# Patient Record
Sex: FEMALE | Race: OTHER | HISPANIC OR LATINO | URBAN - METROPOLITAN AREA
[De-identification: names, ages, dates, MRNs, and addresses within clinical notes are randomized per-mention and may not be internally consistent; named-entity substitution may affect disease eponyms.]

---

## 2017-05-19 ENCOUNTER — INPATIENT (INPATIENT)
Facility: HOSPITAL | Age: 37
LOS: 4 days | Discharge: HOME | End: 2017-05-24
Attending: SPECIALIST | Admitting: SPECIALIST
Payer: COMMERCIAL

## 2017-05-19 VITALS
DIASTOLIC BLOOD PRESSURE: 72 MMHG | RESPIRATION RATE: 18 BRPM | SYSTOLIC BLOOD PRESSURE: 116 MMHG | TEMPERATURE: 98 F | OXYGEN SATURATION: 97 % | HEART RATE: 64 BPM

## 2017-05-19 DIAGNOSIS — O26.899 OTHER SPECIFIED PREGNANCY RELATED CONDITIONS, UNSPECIFIED TRIMESTER: ICD-10-CM

## 2017-05-19 DIAGNOSIS — Z3A.00 WEEKS OF GESTATION OF PREGNANCY NOT SPECIFIED: ICD-10-CM

## 2017-05-19 LAB
BASOPHILS # BLD AUTO: 0.01 K/UL — SIGNIFICANT CHANGE UP (ref 0–0.2)
BASOPHILS NFR BLD AUTO: 0.1 % — SIGNIFICANT CHANGE UP (ref 0–2)
EOSINOPHIL # BLD AUTO: 0.04 K/UL — SIGNIFICANT CHANGE UP (ref 0–0.5)
EOSINOPHIL NFR BLD AUTO: 0.4 % — SIGNIFICANT CHANGE UP (ref 0–6)
HCT VFR BLD CALC: 37.8 % — SIGNIFICANT CHANGE UP (ref 34.5–45)
HGB BLD-MCNC: 12.6 G/DL — SIGNIFICANT CHANGE UP (ref 11.5–15.5)
IMM GRANULOCYTES NFR BLD AUTO: 0.3 % — SIGNIFICANT CHANGE UP (ref 0–1.5)
LYMPHOCYTES # BLD AUTO: 2.15 K/UL — SIGNIFICANT CHANGE UP (ref 1–3.3)
LYMPHOCYTES # BLD AUTO: 21.3 % — SIGNIFICANT CHANGE UP (ref 13–44)
MCHC RBC-ENTMCNC: 31.2 PG — SIGNIFICANT CHANGE UP (ref 27–34)
MCHC RBC-ENTMCNC: 33.3 % — SIGNIFICANT CHANGE UP (ref 32–36)
MCV RBC AUTO: 93.6 FL — SIGNIFICANT CHANGE UP (ref 80–100)
MONOCYTES # BLD AUTO: 0.66 K/UL — SIGNIFICANT CHANGE UP (ref 0–0.9)
MONOCYTES NFR BLD AUTO: 6.5 % — SIGNIFICANT CHANGE UP (ref 2–14)
NEUTROPHILS # BLD AUTO: 7.2 K/UL — SIGNIFICANT CHANGE UP (ref 1.8–7.4)
NEUTROPHILS NFR BLD AUTO: 71.4 % — SIGNIFICANT CHANGE UP (ref 43–77)
PLATELET # BLD AUTO: 258 K/UL — SIGNIFICANT CHANGE UP (ref 150–400)
PMV BLD: 9.7 FL — SIGNIFICANT CHANGE UP (ref 7–13)
RBC # BLD: 4.04 M/UL — SIGNIFICANT CHANGE UP (ref 3.8–5.2)
RBC # FLD: 13.5 % — SIGNIFICANT CHANGE UP (ref 10.3–14.5)
WBC # BLD: 10.09 K/UL — SIGNIFICANT CHANGE UP (ref 3.8–10.5)
WBC # FLD AUTO: 10.09 K/UL — SIGNIFICANT CHANGE UP (ref 3.8–10.5)

## 2017-05-19 RX ORDER — OXYTOCIN 10 UNIT/ML
333.33 VIAL (ML) INJECTION
Qty: 20 | Refills: 0 | Status: DISCONTINUED | OUTPATIENT
Start: 2017-05-19 | End: 2017-05-19

## 2017-05-19 RX ORDER — SODIUM CHLORIDE 9 MG/ML
1000 INJECTION, SOLUTION INTRAVENOUS
Qty: 0 | Refills: 0 | Status: DISCONTINUED | OUTPATIENT
Start: 2017-05-19 | End: 2017-05-21

## 2017-05-19 RX ORDER — OXYTOCIN 10 UNIT/ML
333.33 VIAL (ML) INJECTION
Qty: 20 | Refills: 0 | Status: COMPLETED | OUTPATIENT
Start: 2017-05-19

## 2017-05-19 RX ORDER — SODIUM CHLORIDE 9 MG/ML
1000 INJECTION, SOLUTION INTRAVENOUS ONCE
Qty: 0 | Refills: 0 | Status: DISCONTINUED | OUTPATIENT
Start: 2017-05-19 | End: 2017-05-19

## 2017-05-19 RX ORDER — SODIUM CHLORIDE 9 MG/ML
1000 INJECTION, SOLUTION INTRAVENOUS
Qty: 0 | Refills: 0 | Status: DISCONTINUED | OUTPATIENT
Start: 2017-05-19 | End: 2017-05-19

## 2017-05-19 RX ORDER — CITRIC ACID/SODIUM CITRATE 300-500 MG
15 SOLUTION, ORAL ORAL EVERY 4 HOURS
Qty: 0 | Refills: 0 | Status: DISCONTINUED | OUTPATIENT
Start: 2017-05-19 | End: 2017-05-21

## 2017-05-20 LAB — T PALLIDUM AB TITR SER: NEGATIVE — SIGNIFICANT CHANGE UP

## 2017-05-20 PROCEDURE — 93010 ELECTROCARDIOGRAM REPORT: CPT

## 2017-05-20 RX ORDER — MORPHINE SULFATE 50 MG/1
4 CAPSULE, EXTENDED RELEASE ORAL ONCE
Qty: 0 | Refills: 0 | Status: DISCONTINUED | OUTPATIENT
Start: 2017-05-20 | End: 2017-05-21

## 2017-05-20 RX ORDER — OXYTOCIN 10 UNIT/ML
333.33 VIAL (ML) INJECTION
Qty: 20 | Refills: 0 | Status: DISCONTINUED | OUTPATIENT
Start: 2017-05-20 | End: 2017-05-23

## 2017-05-20 RX ORDER — OXYTOCIN 10 UNIT/ML
2 VIAL (ML) INJECTION
Qty: 30 | Refills: 0 | Status: DISCONTINUED | OUTPATIENT
Start: 2017-05-20 | End: 2017-05-21

## 2017-05-20 RX ADMIN — SODIUM CHLORIDE 125 MILLILITER(S): 9 INJECTION, SOLUTION INTRAVENOUS at 20:01

## 2017-05-20 RX ADMIN — Medication 2 MILLIUNIT(S)/MIN: at 20:01

## 2017-05-20 RX ADMIN — Medication 2 MILLIUNIT(S)/MIN: at 17:54

## 2017-05-21 ENCOUNTER — TRANSCRIPTION ENCOUNTER (OUTPATIENT)
Age: 37
End: 2017-05-21

## 2017-05-21 LAB
BUN SERPL-MCNC: 5 MG/DL — LOW (ref 7–23)
CALCIUM SERPL-MCNC: 8.7 MG/DL — SIGNIFICANT CHANGE UP (ref 8.4–10.5)
CHLORIDE SERPL-SCNC: 104 MMOL/L — SIGNIFICANT CHANGE UP (ref 98–107)
CO2 SERPL-SCNC: 21 MMOL/L — LOW (ref 22–31)
CREAT SERPL-MCNC: 0.53 MG/DL — SIGNIFICANT CHANGE UP (ref 0.5–1.3)
GLUCOSE SERPL-MCNC: 107 MG/DL — HIGH (ref 70–99)
POTASSIUM SERPL-MCNC: 4.2 MMOL/L — SIGNIFICANT CHANGE UP (ref 3.5–5.3)
POTASSIUM SERPL-SCNC: 4.2 MMOL/L — SIGNIFICANT CHANGE UP (ref 3.5–5.3)
SODIUM SERPL-SCNC: 139 MMOL/L — SIGNIFICANT CHANGE UP (ref 135–145)

## 2017-05-21 PROCEDURE — 93010 ELECTROCARDIOGRAM REPORT: CPT

## 2017-05-21 RX ORDER — FERROUS SULFATE 325(65) MG
325 TABLET ORAL DAILY
Qty: 0 | Refills: 0 | Status: DISCONTINUED | OUTPATIENT
Start: 2017-05-21 | End: 2017-05-24

## 2017-05-21 RX ORDER — OXYCODONE HYDROCHLORIDE 5 MG/1
5 TABLET ORAL EVERY 4 HOURS
Qty: 0 | Refills: 0 | Status: DISCONTINUED | OUTPATIENT
Start: 2017-05-21 | End: 2017-05-24

## 2017-05-21 RX ORDER — SODIUM CHLORIDE 9 MG/ML
1000 INJECTION, SOLUTION INTRAVENOUS
Qty: 0 | Refills: 0 | Status: DISCONTINUED | OUTPATIENT
Start: 2017-05-21 | End: 2017-05-22

## 2017-05-21 RX ORDER — OXYTOCIN 10 UNIT/ML
41.67 VIAL (ML) INJECTION
Qty: 20 | Refills: 0 | Status: DISCONTINUED | OUTPATIENT
Start: 2017-05-21 | End: 2017-05-23

## 2017-05-21 RX ORDER — SIMETHICONE 80 MG/1
80 TABLET, CHEWABLE ORAL EVERY 4 HOURS
Qty: 0 | Refills: 0 | Status: DISCONTINUED | OUTPATIENT
Start: 2017-05-21 | End: 2017-05-24

## 2017-05-21 RX ORDER — TETANUS TOXOID, REDUCED DIPHTHERIA TOXOID AND ACELLULAR PERTUSSIS VACCINE, ADSORBED 5; 2.5; 8; 8; 2.5 [IU]/.5ML; [IU]/.5ML; UG/.5ML; UG/.5ML; UG/.5ML
0.5 SUSPENSION INTRAMUSCULAR ONCE
Qty: 0 | Refills: 0 | Status: DISCONTINUED | OUTPATIENT
Start: 2017-05-21 | End: 2017-05-24

## 2017-05-21 RX ORDER — DIPHENHYDRAMINE HCL 50 MG
25 CAPSULE ORAL EVERY 6 HOURS
Qty: 0 | Refills: 0 | Status: DISCONTINUED | OUTPATIENT
Start: 2017-05-21 | End: 2017-05-24

## 2017-05-21 RX ORDER — LANOLIN
1 OINTMENT (GRAM) TOPICAL
Qty: 0 | Refills: 0 | Status: DISCONTINUED | OUTPATIENT
Start: 2017-05-21 | End: 2017-05-24

## 2017-05-21 RX ORDER — METOCLOPRAMIDE HCL 10 MG
10 TABLET ORAL ONCE
Qty: 0 | Refills: 0 | Status: DISCONTINUED | OUTPATIENT
Start: 2017-05-21 | End: 2017-05-21

## 2017-05-21 RX ORDER — FAMOTIDINE 10 MG/ML
20 INJECTION INTRAVENOUS ONCE
Qty: 0 | Refills: 0 | Status: DISCONTINUED | OUTPATIENT
Start: 2017-05-21 | End: 2017-05-21

## 2017-05-21 RX ORDER — OXYTOCIN 10 UNIT/ML
333.33 VIAL (ML) INJECTION
Qty: 20 | Refills: 0 | Status: DISCONTINUED | OUTPATIENT
Start: 2017-05-21 | End: 2017-05-23

## 2017-05-21 RX ORDER — DOCUSATE SODIUM 100 MG
100 CAPSULE ORAL
Qty: 0 | Refills: 0 | Status: DISCONTINUED | OUTPATIENT
Start: 2017-05-21 | End: 2017-05-24

## 2017-05-21 RX ORDER — GLYCERIN ADULT
1 SUPPOSITORY, RECTAL RECTAL AT BEDTIME
Qty: 0 | Refills: 0 | Status: DISCONTINUED | OUTPATIENT
Start: 2017-05-21 | End: 2017-05-24

## 2017-05-21 RX ORDER — IBUPROFEN 200 MG
600 TABLET ORAL EVERY 6 HOURS
Qty: 0 | Refills: 0 | Status: COMPLETED | OUTPATIENT
Start: 2017-05-21 | End: 2018-04-19

## 2017-05-21 RX ORDER — ONDANSETRON 8 MG/1
4 TABLET, FILM COATED ORAL ONCE
Qty: 0 | Refills: 0 | Status: COMPLETED | OUTPATIENT
Start: 2017-05-21 | End: 2017-05-21

## 2017-05-21 RX ORDER — CITRIC ACID/SODIUM CITRATE 300-500 MG
30 SOLUTION, ORAL ORAL ONCE
Qty: 0 | Refills: 0 | Status: DISCONTINUED | OUTPATIENT
Start: 2017-05-21 | End: 2017-05-21

## 2017-05-21 RX ORDER — ACETAMINOPHEN 500 MG
975 TABLET ORAL EVERY 6 HOURS
Qty: 0 | Refills: 0 | Status: COMPLETED | OUTPATIENT
Start: 2017-05-21 | End: 2018-04-19

## 2017-05-21 RX ORDER — OXYCODONE HYDROCHLORIDE 5 MG/1
5 TABLET ORAL
Qty: 0 | Refills: 0 | Status: DISCONTINUED | OUTPATIENT
Start: 2017-05-21 | End: 2017-05-24

## 2017-05-21 RX ORDER — KETOROLAC TROMETHAMINE 30 MG/ML
30 SYRINGE (ML) INJECTION EVERY 6 HOURS
Qty: 0 | Refills: 0 | Status: DISCONTINUED | OUTPATIENT
Start: 2017-05-21 | End: 2017-05-22

## 2017-05-21 RX ORDER — HEPARIN SODIUM 5000 [USP'U]/ML
5000 INJECTION INTRAVENOUS; SUBCUTANEOUS EVERY 12 HOURS
Qty: 0 | Refills: 0 | Status: DISCONTINUED | OUTPATIENT
Start: 2017-05-21 | End: 2017-05-24

## 2017-05-21 RX ADMIN — Medication 30 MILLIGRAM(S): at 19:35

## 2017-05-21 RX ADMIN — FAMOTIDINE 20 MILLIGRAM(S): 10 INJECTION INTRAVENOUS at 12:01

## 2017-05-21 RX ADMIN — HEPARIN SODIUM 5000 UNIT(S): 5000 INJECTION INTRAVENOUS; SUBCUTANEOUS at 18:52

## 2017-05-21 RX ADMIN — ONDANSETRON 4 MILLIGRAM(S): 8 TABLET, FILM COATED ORAL at 10:25

## 2017-05-21 RX ADMIN — Medication 10 MILLIGRAM(S): at 12:01

## 2017-05-21 RX ADMIN — Medication 30 MILLILITER(S): at 12:01

## 2017-05-21 RX ADMIN — Medication 30 MILLIGRAM(S): at 18:52

## 2017-05-21 RX ADMIN — Medication 125 MILLIUNIT(S)/MIN: at 14:24

## 2017-05-21 RX ADMIN — Medication 1000 MILLIUNIT(S)/MIN: at 14:24

## 2017-05-21 NOTE — DISCHARGE NOTE OB - MEDICATION SUMMARY - MEDICATIONS TO TAKE
I will START or STAY ON the medications listed below when I get home from the hospital:    Prenatabs Rx oral tablet  -- 1 tab(s) by mouth once a day  -- Indication: For Other pregnancy-related conditions, antepartum I will START or STAY ON the medications listed below when I get home from the hospital:    acetaminophen 325 mg oral tablet  -- 3 tab(s) by mouth every 6 hours, As Needed  -- Indication: For pain    ibuprofen 600 mg oral tablet  -- 1 tab(s) by mouth every 6 hours, As Needed  -- Indication: For pain    Prenatabs Rx oral tablet  -- 1 tab(s) by mouth once a day  -- Indication: For maternal health

## 2017-05-21 NOTE — PACU DISCHARGE NOTE - COMMENTS
baseline asymptomatic bradycardia.  EKG with Sinus Bradycardia.  Patient is sitting upright with stable  BP and completely without symptoms.

## 2017-05-21 NOTE — DISCHARGE NOTE OB - PATIENT PORTAL LINK FT
“You can access the [employer] Patient Portal, offered by [facility], by registering with the following website: [email or web address]”

## 2017-05-21 NOTE — DISCHARGE NOTE OB - CARE PROVIDER_API CALL
Jd Mathis (MD), Obstetrics and Gynecology  [address] [place] [address]  Phone: [phone]  Fax: [phone]

## 2017-05-21 NOTE — DISCHARGE NOTE OB - MATERIALS PROVIDED
Bottle Feeding Log/[place]  Immunization Record/Guide to Postpartum Care/Discharge Medication Information for Patients and Families Pocket Guide/Vaccinations/[facility] [place] Screening Program/Breastfeeding Log/Shaken Baby Prevention Handout

## 2017-05-21 NOTE — CHART NOTE - NSCHARTNOTEFT_GEN_A_CORE
Patient is 36y  old.    Event :bradycardia 39 bpm    Vital Signs Last 24 Hrs  T(C): 36.8, Max: 36.8 (05-21 @ 13:30)  T(F): 98.2, Max: 98.2 (05-21 @ 13:30)  HR: 42 (42 - 56)  BP: 122/60 (81/69 - 122/60)  BP(mean): 72 (41 - 72)  RR: 14 (14 - 20)  SpO2: 100% (100% - 100%)    I&O's Detail  I & Os for 24h ending 21 May 2017 07:00  =============================================  IN:    lactated ringers.: 1875 ml    Total IN: 1875 ml  ---------------------------------------------  OUT:    Indwelling Catheter - Urethral: 1350 ml    Total OUT: 1350 ml  ---------------------------------------------  Total NET: 525 ml    I & Os for current day (as of 21 May 2017 14:55)  =============================================  IN:    Other: 2000 ml    Total IN: 2000 ml  ---------------------------------------------  OUT:    Indwelling Catheter - Urethral: 1200 ml    Estimated Blood Loss: 950 ml    Other: 150 ml    Total OUT: 2300 ml  ---------------------------------------------  Total NET: -300 ml      Labs:                        12.6   10.09 )-----------( 258      ( 19 May 2017 17:15 )             37.8             Fibrinogen:     Lactate:     MEDICATIONS  (STANDING):  oxytocin Infusion 333.333milliUNIT(s)/Min IV Continuous <Continuous>  oxytocin Infusion 333.333milliUNIT(s)/Min IV Continuous <Continuous>  oxytocin Infusion 41.667milliUNIT(s)/Min IV Continuous <Continuous>  ketorolac   Injectable 30milliGRAM(s) IV Push every 6 hours      Evaluation :bradycardia 39 bpm SB, was 43 BP yesterday on EKG with SB, VSS, no complaints    Differential Dg :chronic SB, new arrhythmia, medication effect, electrolyte abnormaility    Management and Follow-up plan :EKG shows SB at 39 bpm, BMP stat ordered, monitor Cardiology consult if further symptoms develop or decreased HR              -------------------------------------------------------------------------------------------------------------

## 2017-05-21 NOTE — DISCHARGE NOTE OB - CARE PLAN
Principal Discharge DX:	 delivery delivered  Goal:	discharge 17  Instructions for follow-up, activity and diet:	follow up in 1 week

## 2017-05-22 LAB
BASOPHILS # BLD AUTO: 0.01 K/UL — SIGNIFICANT CHANGE UP (ref 0–0.2)
BASOPHILS NFR BLD AUTO: 0.1 % — SIGNIFICANT CHANGE UP (ref 0–2)
EOSINOPHIL # BLD AUTO: 0.02 K/UL — SIGNIFICANT CHANGE UP (ref 0–0.5)
EOSINOPHIL NFR BLD AUTO: 0.1 % — SIGNIFICANT CHANGE UP (ref 0–6)
HCT VFR BLD CALC: 29.1 % — LOW (ref 34.5–45)
HGB BLD-MCNC: 9.7 G/DL — LOW (ref 11.5–15.5)
IMM GRANULOCYTES NFR BLD AUTO: 0.3 % — SIGNIFICANT CHANGE UP (ref 0–1.5)
LYMPHOCYTES # BLD AUTO: 14.2 % — SIGNIFICANT CHANGE UP (ref 13–44)
LYMPHOCYTES # BLD AUTO: 2.38 K/UL — SIGNIFICANT CHANGE UP (ref 1–3.3)
MCHC RBC-ENTMCNC: 31.1 PG — SIGNIFICANT CHANGE UP (ref 27–34)
MCHC RBC-ENTMCNC: 33.3 % — SIGNIFICANT CHANGE UP (ref 32–36)
MCV RBC AUTO: 93.3 FL — SIGNIFICANT CHANGE UP (ref 80–100)
MONOCYTES # BLD AUTO: 0.86 K/UL — SIGNIFICANT CHANGE UP (ref 0–0.9)
MONOCYTES NFR BLD AUTO: 5.1 % — SIGNIFICANT CHANGE UP (ref 2–14)
NEUTROPHILS # BLD AUTO: 13.42 K/UL — HIGH (ref 1.8–7.4)
NEUTROPHILS NFR BLD AUTO: 80.2 % — HIGH (ref 43–77)
PLATELET # BLD AUTO: 211 K/UL — SIGNIFICANT CHANGE UP (ref 150–400)
PMV BLD: 9.5 FL — SIGNIFICANT CHANGE UP (ref 7–13)
RBC # BLD: 3.12 M/UL — LOW (ref 3.8–5.2)
RBC # FLD: 13.6 % — SIGNIFICANT CHANGE UP (ref 10.3–14.5)
WBC # BLD: 16.74 K/UL — HIGH (ref 3.8–10.5)
WBC # FLD AUTO: 16.74 K/UL — HIGH (ref 3.8–10.5)

## 2017-05-22 RX ORDER — IBUPROFEN 200 MG
600 TABLET ORAL EVERY 6 HOURS
Qty: 0 | Refills: 0 | Status: DISCONTINUED | OUTPATIENT
Start: 2017-05-22 | End: 2017-05-24

## 2017-05-22 RX ORDER — ACETAMINOPHEN 500 MG
975 TABLET ORAL EVERY 6 HOURS
Qty: 0 | Refills: 0 | Status: DISCONTINUED | OUTPATIENT
Start: 2017-05-22 | End: 2017-05-24

## 2017-05-22 RX ADMIN — Medication 975 MILLIGRAM(S): at 17:15

## 2017-05-22 RX ADMIN — Medication 1 TABLET(S): at 12:55

## 2017-05-22 RX ADMIN — Medication 975 MILLIGRAM(S): at 23:10

## 2017-05-22 RX ADMIN — Medication 975 MILLIGRAM(S): at 16:35

## 2017-05-22 RX ADMIN — Medication 30 MILLIGRAM(S): at 13:43

## 2017-05-22 RX ADMIN — Medication 30 MILLIGRAM(S): at 07:00

## 2017-05-22 RX ADMIN — SIMETHICONE 80 MILLIGRAM(S): 80 TABLET, CHEWABLE ORAL at 12:54

## 2017-05-22 RX ADMIN — Medication 30 MILLIGRAM(S): at 00:48

## 2017-05-22 RX ADMIN — Medication 100 MILLIGRAM(S): at 12:54

## 2017-05-22 RX ADMIN — HEPARIN SODIUM 5000 UNIT(S): 5000 INJECTION INTRAVENOUS; SUBCUTANEOUS at 18:51

## 2017-05-22 RX ADMIN — Medication 30 MILLIGRAM(S): at 01:30

## 2017-05-22 RX ADMIN — Medication 600 MILLIGRAM(S): at 18:51

## 2017-05-22 RX ADMIN — Medication 325 MILLIGRAM(S): at 12:55

## 2017-05-22 RX ADMIN — HEPARIN SODIUM 5000 UNIT(S): 5000 INJECTION INTRAVENOUS; SUBCUTANEOUS at 06:38

## 2017-05-22 RX ADMIN — Medication 975 MILLIGRAM(S): at 22:36

## 2017-05-22 RX ADMIN — Medication 30 MILLIGRAM(S): at 06:38

## 2017-05-22 RX ADMIN — Medication 30 MILLIGRAM(S): at 12:55

## 2017-05-22 NOTE — LACTATION INITIAL EVALUATION - INTERVENTION OUTCOME
good return demonstration/verbalizes understanding/demonstrates understanding of teaching/Mother fitted for nipple shield with instruction and colostrum noted in shield and  latched well and swallowing noted and discussed feeding cues and to breastfeed 8-12 times in 24 hours and discussed breastfeeding log and assist prn, RN aware of plan

## 2017-05-22 NOTE — LACTATION INITIAL EVALUATION - LACTATION INTERVENTIONS
stimulate nutritive suck using/initiate dual electric pump routine/initiate hand expression routine/initiate skin to skin

## 2017-05-22 NOTE — PROGRESS NOTE ADULT - SUBJECTIVE AND OBJECTIVE BOX
Postpartum Note,  Section  37 yo  woman who is now POD#1 for pLTCS for NRFHTs, .    Subjective:  The patient feels well, with pain well controlled.   She is ambulating.   She is tolerating a regular diet.  She denies nausea and vomiting. Indwelling Catheter in place.   She reports normal postpartum bleeding.    Physical exam:    Vital Signs Last 24 Hrs  T(C): 36.6, Max: 37.3 (- @ 01:06)  T(F): 97.9, Max: 99.1 (05- @ 01:06)  HR: 76 (39 - 86)  BP: 104/60 (81/69 - 122/60)  BP(mean): 77 (41 - 86)  RR: 18 (12 - 20)  SpO2: 99% (98% - 100%)    Gen: NAD  Breast: Soft, nontender, not engorged.  Abdomen: Soft, nontender, no distension , firm uterine fundus at umbilicus.  Incision: Clean, dry, and intact with steri strips  Pelvic: Normal lochia noted  Ext: No calf tenderness      Allergies  No Known Allergies      MEDICATIONS  (STANDING):  oxytocin Infusion 333.333milliUNIT(s)/Min IV Continuous <Continuous>  lactated ringers. 1000milliLiter(s) IV Continuous <Continuous>  oxytocin Infusion 333.333milliUNIT(s)/Min IV Continuous <Continuous>  oxytocin Infusion 41.667milliUNIT(s)/Min IV Continuous <Continuous>  lactated ringers. 1000milliLiter(s) IV Continuous <Continuous>  diphtheria/tetanus/pertussis (acellular) Vaccine (ADAcel) 0.5milliLiter(s) IntraMuscular once  oxytocin Infusion 41.667milliUNIT(s)/Min IV Continuous <Continuous>  ferrous    sulfate 325milliGRAM(s) Oral daily  prenatal multivitamin 1Tablet(s) Oral daily  oxyCODONE IR 5milliGRAM(s) Oral every 3 hours  ibuprofen  Tablet 600milliGRAM(s) Oral every 6 hours  acetaminophen   Tablet. 975milliGRAM(s) Oral every 6 hours  ketorolac   Injectable 30milliGRAM(s) IV Push every 6 hours  heparin  Injectable 5000Unit(s) SubCutaneous every 12 hours    MEDICATIONS  (PRN):  simethicone 80milliGRAM(s) Chew every 4 hours PRN Gas  diphenhydrAMINE   Capsule 25milliGRAM(s) Oral every 6 hours PRN Itching  glycerin Suppository - Adult 1Suppository(s) Rectal at bedtime PRN Constipation  docusate sodium 100milliGRAM(s) Oral two times a day PRN Stool Softening  lanolin Ointment 1Application(s) Topical every 3 hours PRN Sore Nipples  oxyCODONE IR 5milliGRAM(s) Oral every 4 hours PRN Severe Pain (7 - 10)      Assessment and Plan  37 yo  woman  POD #1  for pLTCS for NRFHTs.  Doing well. Encourage ambulation.  CBC in AM. Remove Castaneda. Removed Dressing. Postpartum Note,  Section  35 yo  woman who is now POD#1 for pLTCS for NRFHTs, .    Subjective:  The patient feels well, with pain well controlled.   She is ambulating.   She is tolerating a regular diet.  She denies nausea and vomiting. Indwelling Catheter in place.   She reports normal postpartum bleeding. She has not yet passed any flatus.     Physical exam:    Vital Signs Last 24 Hrs  T(C): 36.6, Max: 37.3 (- @ 01:06)  T(F): 97.9, Max: 99.1 (05 @ 01:06)  HR: 76 (39 - 86)  BP: 104/60 (81/69 - 122/60)  BP(mean): 77 (41 - 86)  RR: 18 (12 - 20)  SpO2: 99% (98% - 100%)    Gen: NAD  Breast: Soft, nontender, not engorged.  Abdomen: Soft, nontender, no distension , firm uterine fundus at umbilicus.  Incision: Clean, dry, and intact with steri strips  Pelvic: Normal lochia noted  Ext: No calf tenderness      Allergies  No Known Allergies      MEDICATIONS  (STANDING):  oxytocin Infusion 333.333milliUNIT(s)/Min IV Continuous <Continuous>  lactated ringers. 1000milliLiter(s) IV Continuous <Continuous>  oxytocin Infusion 333.333milliUNIT(s)/Min IV Continuous <Continuous>  oxytocin Infusion 41.667milliUNIT(s)/Min IV Continuous <Continuous>  lactated ringers. 1000milliLiter(s) IV Continuous <Continuous>  diphtheria/tetanus/pertussis (acellular) Vaccine (ADAcel) 0.5milliLiter(s) IntraMuscular once  oxytocin Infusion 41.667milliUNIT(s)/Min IV Continuous <Continuous>  ferrous    sulfate 325milliGRAM(s) Oral daily  prenatal multivitamin 1Tablet(s) Oral daily  oxyCODONE IR 5milliGRAM(s) Oral every 3 hours  ibuprofen  Tablet 600milliGRAM(s) Oral every 6 hours  acetaminophen   Tablet. 975milliGRAM(s) Oral every 6 hours  ketorolac   Injectable 30milliGRAM(s) IV Push every 6 hours  heparin  Injectable 5000Unit(s) SubCutaneous every 12 hours    MEDICATIONS  (PRN):  simethicone 80milliGRAM(s) Chew every 4 hours PRN Gas  diphenhydrAMINE   Capsule 25milliGRAM(s) Oral every 6 hours PRN Itching  glycerin Suppository - Adult 1Suppository(s) Rectal at bedtime PRN Constipation  docusate sodium 100milliGRAM(s) Oral two times a day PRN Stool Softening  lanolin Ointment 1Application(s) Topical every 3 hours PRN Sore Nipples  oxyCODONE IR 5milliGRAM(s) Oral every 4 hours PRN Severe Pain (7 - 10)      Assessment and Plan  35 yo  woman  POD #1  for pLTCS for NRFHTs.  Doing well. Encourage ambulation.  CBC in AM. Remove Castaneda. Removed Dressing.

## 2017-05-22 NOTE — PROGRESS NOTE ADULT - SUBJECTIVE AND OBJECTIVE BOX
Post Op C/S day 1      Pt without comlaints  vital signs stable      Abdomen soft  fundus firm  Incision clean dry and intact  extremities non tender    lochia wnl      Patient doing well  Routine post operative care

## 2017-05-22 NOTE — PROGRESS NOTE ADULT - SUBJECTIVE AND OBJECTIVE BOX
Day _2__ of Anesthesia Pain Management Service    SUBJECTIVE:  Pain Scale Score	At rest: _0__ 	With Activity: __2_ 	[x ] Refer to charted pain scores    THERAPY:    s/p ___0.15_____ mg PF morphine on _5/21/17_____      MEDICATIONS  (STANDING):  oxytocin Infusion 333.333milliUNIT(s)/Min IV Continuous <Continuous>  lactated ringers. 1000milliLiter(s) IV Continuous <Continuous>  oxytocin Infusion 333.333milliUNIT(s)/Min IV Continuous <Continuous>  oxytocin Infusion 41.667milliUNIT(s)/Min IV Continuous <Continuous>  lactated ringers. 1000milliLiter(s) IV Continuous <Continuous>  diphtheria/tetanus/pertussis (acellular) Vaccine (ADAcel) 0.5milliLiter(s) IntraMuscular once  oxytocin Infusion 41.667milliUNIT(s)/Min IV Continuous <Continuous>  ferrous    sulfate 325milliGRAM(s) Oral daily  prenatal multivitamin 1Tablet(s) Oral daily  oxyCODONE IR 5milliGRAM(s) Oral every 3 hours  ibuprofen  Tablet 600milliGRAM(s) Oral every 6 hours  acetaminophen   Tablet. 975milliGRAM(s) Oral every 6 hours  ketorolac   Injectable 30milliGRAM(s) IV Push every 6 hours  heparin  Injectable 5000Unit(s) SubCutaneous every 12 hours    MEDICATIONS  (PRN):  simethicone 80milliGRAM(s) Chew every 4 hours PRN Gas  diphenhydrAMINE   Capsule 25milliGRAM(s) Oral every 6 hours PRN Itching  glycerin Suppository - Adult 1Suppository(s) Rectal at bedtime PRN Constipation  docusate sodium 100milliGRAM(s) Oral two times a day PRN Stool Softening  lanolin Ointment 1Application(s) Topical every 3 hours PRN Sore Nipples  oxyCODONE IR 5milliGRAM(s) Oral every 4 hours PRN Severe Pain (7 - 10)      OBJECTIVE:    Sedation Score:	[x ] Alert	[ ] Drowsy	[ ] Arousable	[ ] Asleep	[ ] Unresponsive    Side Effects:	[x ] None	[ ] Nausea	[ ] Vomiting	[ ] Pruritus  		  [ ] Weakness		[ ] Numbness	[ ] Other:    Vital Signs Last 24 Hrs  T(C): 36.6, Max: 37.3 (05-22 @ 01:06)  T(F): 97.9, Max: 99.1 (05-22 @ 01:06)  HR: 76 (39 - 86)  BP: 104/60 (81/69 - 122/60)  BP(mean): 77 (41 - 86)  RR: 18 (12 - 20)  SpO2: 99% (98% - 100%)    ASSESSMENT/ PLAN  [x ] Patient transitioned to prn analgesics  [x ] Pain management per primary service, pain service to sign off   [x ]Documentation and Verification of current medications     Comments:

## 2017-05-23 DIAGNOSIS — O26.899 OTHER SPECIFIED PREGNANCY RELATED CONDITIONS, UNSPECIFIED TRIMESTER: ICD-10-CM

## 2017-05-23 DIAGNOSIS — O41.00X0 OLIGOHYDRAMNIOS, UNSPECIFIED TRIMESTER, NOT APPLICABLE OR UNSPECIFIED: ICD-10-CM

## 2017-05-23 LAB
BLD GP AB SCN SERPL QL: NEGATIVE — SIGNIFICANT CHANGE UP
RH IG SCN BLD-IMP: POSITIVE — SIGNIFICANT CHANGE UP

## 2017-05-23 RX ORDER — MAGNESIUM HYDROXIDE 400 MG/1
30 TABLET, CHEWABLE ORAL DAILY
Qty: 0 | Refills: 0 | Status: DISCONTINUED | OUTPATIENT
Start: 2017-05-23 | End: 2017-05-24

## 2017-05-23 RX ADMIN — Medication 325 MILLIGRAM(S): at 12:57

## 2017-05-23 RX ADMIN — Medication 975 MILLIGRAM(S): at 05:30

## 2017-05-23 RX ADMIN — Medication 600 MILLIGRAM(S): at 05:29

## 2017-05-23 RX ADMIN — Medication 600 MILLIGRAM(S): at 12:57

## 2017-05-23 RX ADMIN — Medication 975 MILLIGRAM(S): at 19:03

## 2017-05-23 RX ADMIN — Medication 600 MILLIGRAM(S): at 01:00

## 2017-05-23 RX ADMIN — Medication 600 MILLIGRAM(S): at 13:30

## 2017-05-23 RX ADMIN — Medication 600 MILLIGRAM(S): at 18:42

## 2017-05-23 RX ADMIN — Medication 600 MILLIGRAM(S): at 00:22

## 2017-05-23 RX ADMIN — Medication 1 TABLET(S): at 12:58

## 2017-05-23 RX ADMIN — Medication 975 MILLIGRAM(S): at 13:30

## 2017-05-23 RX ADMIN — HEPARIN SODIUM 5000 UNIT(S): 5000 INJECTION INTRAVENOUS; SUBCUTANEOUS at 18:48

## 2017-05-23 RX ADMIN — Medication 100 MILLIGRAM(S): at 12:57

## 2017-05-23 RX ADMIN — Medication 975 MILLIGRAM(S): at 18:42

## 2017-05-23 RX ADMIN — Medication 600 MILLIGRAM(S): at 19:03

## 2017-05-23 RX ADMIN — Medication 975 MILLIGRAM(S): at 12:57

## 2017-05-23 RX ADMIN — Medication 975 MILLIGRAM(S): at 06:10

## 2017-05-23 RX ADMIN — HEPARIN SODIUM 5000 UNIT(S): 5000 INJECTION INTRAVENOUS; SUBCUTANEOUS at 05:30

## 2017-05-23 RX ADMIN — Medication 600 MILLIGRAM(S): at 06:10

## 2017-05-23 NOTE — PROGRESS NOTE ADULT - SUBJECTIVE AND OBJECTIVE BOX
Postpartum Note,  Section  She is a  36y woman who is now post-operative day:     Subjective:  The patient feels well.  She is ambulating.   She is tolerating regular diet.  She denies nausea and vomiting.  She is voiding.  Her pain is controlled.  She reports normal postpartum bleeding.  She is breastfeeding.      Physical exam:    Vital Signs Last 24 Hrs  T(C): 37, Max: 37 (0523 @ 06:00)  T(F): 98.6, Max: 98.6 (0523 @ 06:00)  HR: 68 (63 - 79)  BP: 104/64 (100/60 - 126/63)  BP(mean): --  RR: 17 (17 - 18)  SpO2: 100% (99% - 100%)    Gen: NAD  Abdomen: Soft, nontender, no distension , firm uterine fundus at umbilicus.  Incision: Clean, dry, and intact with steri strips  Pelvic: Normal lochia noted  Ext: No calf tenderness    LABS:                        9.7    16.74 )-----------( 211      ( 22 May 2017 06:37 )             29.1       17 @ 15:00      139  |  104  |  5<L>  ----------------------------<  107<H>  4.2   |  21<L>  |  0.53        Ca    8.7      21 May 2017 15:00          Allergies    No Known Allergies    Intolerances      MEDICATIONS  (STANDING):  diphtheria/tetanus/pertussis (acellular) Vaccine (ADAcel) 0.5milliLiter(s) IntraMuscular once  ferrous    sulfate 325milliGRAM(s) Oral daily  prenatal multivitamin 1Tablet(s) Oral daily  oxyCODONE IR 5milliGRAM(s) Oral every 3 hours  heparin  Injectable 5000Unit(s) SubCutaneous every 12 hours  acetaminophen   Tablet. 975milliGRAM(s) Oral every 6 hours  ibuprofen  Tablet 600milliGRAM(s) Oral every 6 hours    MEDICATIONS  (PRN):  simethicone 80milliGRAM(s) Chew every 4 hours PRN Gas  diphenhydrAMINE   Capsule 25milliGRAM(s) Oral every 6 hours PRN Itching  glycerin Suppository - Adult 1Suppository(s) Rectal at bedtime PRN Constipation  docusate sodium 100milliGRAM(s) Oral two times a day PRN Stool Softening  lanolin Ointment 1Application(s) Topical every 3 hours PRN Sore Nipples  oxyCODONE IR 5milliGRAM(s) Oral every 4 hours PRN Severe Pain (7 - 10)        Assessment and Plan  POD #2 s/p  section  Doing well.  Encourage ambulation.

## 2017-05-24 VITALS
OXYGEN SATURATION: 100 % | RESPIRATION RATE: 18 BRPM | SYSTOLIC BLOOD PRESSURE: 119 MMHG | DIASTOLIC BLOOD PRESSURE: 70 MMHG | HEART RATE: 57 BPM | TEMPERATURE: 98 F

## 2017-05-24 RX ORDER — IBUPROFEN 200 MG
1 TABLET ORAL
Qty: 0 | Refills: 0 | DISCHARGE
Start: 2017-05-24

## 2017-05-24 RX ORDER — ACETAMINOPHEN 500 MG
3 TABLET ORAL
Qty: 0 | Refills: 0 | DISCHARGE
Start: 2017-05-24

## 2017-05-24 RX ADMIN — Medication 975 MILLIGRAM(S): at 07:10

## 2017-05-24 RX ADMIN — Medication 975 MILLIGRAM(S): at 01:00

## 2017-05-24 RX ADMIN — Medication 600 MILLIGRAM(S): at 07:10

## 2017-05-24 RX ADMIN — Medication 975 MILLIGRAM(S): at 00:12

## 2017-05-24 RX ADMIN — HEPARIN SODIUM 5000 UNIT(S): 5000 INJECTION INTRAVENOUS; SUBCUTANEOUS at 06:35

## 2017-05-24 RX ADMIN — Medication 600 MILLIGRAM(S): at 06:35

## 2017-05-24 RX ADMIN — Medication 975 MILLIGRAM(S): at 06:35

## 2017-05-24 RX ADMIN — Medication 600 MILLIGRAM(S): at 01:00

## 2017-05-24 RX ADMIN — Medication 600 MILLIGRAM(S): at 00:12

## 2017-05-24 NOTE — PROGRESS NOTE ADULT - SUBJECTIVE AND OBJECTIVE BOX
SUBJECTIVE:    Pain: Controlled    Complaints: None    MILESTONES:    Alert and Oriented x 3  [x  ]  Out of bed/ ambulating. [x  ]  Flatus:   Positive [ x ]  Negative [  ]  Bowel movement  [  ] Positive [  ] Negative   Voiding [ x ] Due to void [  ]   Castaneda/Indwelling catheter in place [  ]  Diet: Regular [x  ]  Clears [  ]  NPO [  ]    Infant feeding:  Breast [  ]   Bottle [  ]  Both [ x ]  Feeding related issues and/or concerns:      OBJECTIVE:  T(C): 36.7, Max: 37.1 (05-23 @ 14:06)  HR: 57 (57 - 71)  BP: 119/70 (113/64 - 124/57)  RR: 18 (17 - 18)  SpO2: 100% (98% - 100%)  Wt(kg): --        MEDICATIONS  (STANDING):  diphtheria/tetanus/pertussis (acellular) Vaccine (ADAcel) 0.5milliLiter(s) IntraMuscular once  ferrous    sulfate 325milliGRAM(s) Oral daily  prenatal multivitamin 1Tablet(s) Oral daily  oxyCODONE IR 5milliGRAM(s) Oral every 3 hours  heparin  Injectable 5000Unit(s) SubCutaneous every 12 hours  acetaminophen   Tablet. 975milliGRAM(s) Oral every 6 hours  ibuprofen  Tablet 600milliGRAM(s) Oral every 6 hours    MEDICATIONS  (PRN):  simethicone 80milliGRAM(s) Chew every 4 hours PRN Gas  diphenhydrAMINE   Capsule 25milliGRAM(s) Oral every 6 hours PRN Itching  glycerin Suppository - Adult 1Suppository(s) Rectal at bedtime PRN Constipation  docusate sodium 100milliGRAM(s) Oral two times a day PRN Stool Softening  lanolin Ointment 1Application(s) Topical every 3 hours PRN Sore Nipples  oxyCODONE IR 5milliGRAM(s) Oral every 4 hours PRN Severe Pain (7 - 10)  magnesium hydroxide Suspension 30milliLiter(s) Oral daily PRN Constipation        ASSESSMENT:  36y  y/o G 1  P 1001   PO Day#  3      Delivery: Primary [x  ]    Repeat [  ]                                       Indication of procedure: Arrest of Dilation  Condition: Stable  Past Medical History significant for: HPI:    Current Issues:  Breasts:  Soft [x  ]   Engorged [  ]  Nipples:  Abdomen: Soft [ x ]   Distended [  ] Nontender [  ]   Bowel sounds :  Present [  ]  Absent [  ]   Fundus:  Firm [x  ]  Boggy [  ]  Abdominal incision: Clean, Dry and Intact [x  ]  Staples [  ] Steri Strips [x  ] Dermabond [  ] Sutures [  ]  Patient wearing abdominal binder for support.  Vaginal: Lochia:  Heavy [  ]  Moderate [  ]   Scant [ x ]  Extremities: Edema [ x ] negative Kevin's Sign [  ] Nontender Fermin  [ x ] Positive pedal pulses [  ]  Other relevant physical exam findings:      PLAN:  Plan: Increase ambulation, analgesia PRN and pain medication protocol standing oxycodone, ibuprofen and acetaminophen.  Diet: Regular diet    Continue routine post-operative and postpartum care.  Script for breast pump given.    Discharge Planning [ x ]  For discharge today.  Consults:  Social Work [  ]  Lacation [x  ]  Other [         ]

## 2017-10-05 ENCOUNTER — APPOINTMENT (OUTPATIENT)
Dept: SURGERY | Facility: CLINIC | Age: 37
End: 2017-10-05

## 2018-07-25 ENCOUNTER — EMERGENCY (EMERGENCY)
Facility: HOSPITAL | Age: 38
LOS: 1 days | Discharge: HOME | End: 2018-07-25
Attending: EMERGENCY MEDICINE
Payer: COMMERCIAL

## 2018-07-25 VITALS
HEART RATE: 62 BPM | WEIGHT: 175.05 LBS | DIASTOLIC BLOOD PRESSURE: 76 MMHG | HEIGHT: 62 IN | TEMPERATURE: 98 F | OXYGEN SATURATION: 99 % | RESPIRATION RATE: 18 BRPM | SYSTOLIC BLOOD PRESSURE: 133 MMHG

## 2018-07-25 VITALS
TEMPERATURE: 98 F | OXYGEN SATURATION: 98 % | DIASTOLIC BLOOD PRESSURE: 60 MMHG | HEART RATE: 50 BPM | SYSTOLIC BLOOD PRESSURE: 130 MMHG | RESPIRATION RATE: 18 BRPM

## 2018-07-25 LAB
ALBUMIN SERPL ELPH-MCNC: 4 G/DL — SIGNIFICANT CHANGE UP (ref 3.5–5)
ALP SERPL-CCNC: 80 U/L — SIGNIFICANT CHANGE UP (ref 40–120)
ALT FLD-CCNC: 32 U/L DA — SIGNIFICANT CHANGE UP (ref 10–60)
ANION GAP SERPL CALC-SCNC: 7 MMOL/L — SIGNIFICANT CHANGE UP (ref 5–17)
APPEARANCE UR: CLEAR — SIGNIFICANT CHANGE UP
AST SERPL-CCNC: 14 U/L — SIGNIFICANT CHANGE UP (ref 10–40)
BASOPHILS # BLD AUTO: 0.1 K/UL — SIGNIFICANT CHANGE UP (ref 0–0.2)
BASOPHILS NFR BLD AUTO: 0.5 % — SIGNIFICANT CHANGE UP (ref 0–2)
BILIRUB SERPL-MCNC: 0.4 MG/DL — SIGNIFICANT CHANGE UP (ref 0.2–1.2)
BILIRUB UR-MCNC: NEGATIVE — SIGNIFICANT CHANGE UP
BUN SERPL-MCNC: 11 MG/DL — SIGNIFICANT CHANGE UP (ref 7–18)
CALCIUM SERPL-MCNC: 8.9 MG/DL — SIGNIFICANT CHANGE UP (ref 8.4–10.5)
CHLORIDE SERPL-SCNC: 106 MMOL/L — SIGNIFICANT CHANGE UP (ref 96–108)
CO2 SERPL-SCNC: 24 MMOL/L — SIGNIFICANT CHANGE UP (ref 22–31)
COLOR SPEC: YELLOW — SIGNIFICANT CHANGE UP
CREAT SERPL-MCNC: 0.66 MG/DL — SIGNIFICANT CHANGE UP (ref 0.5–1.3)
DIFF PNL FLD: ABNORMAL
EOSINOPHIL # BLD AUTO: 0 K/UL — SIGNIFICANT CHANGE UP (ref 0–0.5)
EOSINOPHIL NFR BLD AUTO: 0.1 % — SIGNIFICANT CHANGE UP (ref 0–6)
GLUCOSE SERPL-MCNC: 113 MG/DL — HIGH (ref 70–99)
GLUCOSE UR QL: NEGATIVE — SIGNIFICANT CHANGE UP
HCG SERPL-ACNC: <1 MIU/ML — SIGNIFICANT CHANGE UP
HCT VFR BLD CALC: 40.1 % — SIGNIFICANT CHANGE UP (ref 34.5–45)
HGB BLD-MCNC: 13.2 G/DL — SIGNIFICANT CHANGE UP (ref 11.5–15.5)
KETONES UR-MCNC: NEGATIVE — SIGNIFICANT CHANGE UP
LEUKOCYTE ESTERASE UR-ACNC: NEGATIVE — SIGNIFICANT CHANGE UP
LIDOCAIN IGE QN: 202 U/L — SIGNIFICANT CHANGE UP (ref 73–393)
LYMPHOCYTES # BLD AUTO: 1.4 K/UL — SIGNIFICANT CHANGE UP (ref 1–3.3)
LYMPHOCYTES # BLD AUTO: 12.4 % — LOW (ref 13–44)
MCHC RBC-ENTMCNC: 30.2 PG — SIGNIFICANT CHANGE UP (ref 27–34)
MCHC RBC-ENTMCNC: 32.9 GM/DL — SIGNIFICANT CHANGE UP (ref 32–36)
MCV RBC AUTO: 91.6 FL — SIGNIFICANT CHANGE UP (ref 80–100)
MONOCYTES # BLD AUTO: 0.5 K/UL — SIGNIFICANT CHANGE UP (ref 0–0.9)
MONOCYTES NFR BLD AUTO: 4.2 % — SIGNIFICANT CHANGE UP (ref 2–14)
NEUTROPHILS # BLD AUTO: 9.6 K/UL — HIGH (ref 1.8–7.4)
NEUTROPHILS NFR BLD AUTO: 82.8 % — HIGH (ref 43–77)
NITRITE UR-MCNC: NEGATIVE — SIGNIFICANT CHANGE UP
PH UR: 5 — SIGNIFICANT CHANGE UP (ref 5–8)
PLATELET # BLD AUTO: 313 K/UL — SIGNIFICANT CHANGE UP (ref 150–400)
POTASSIUM SERPL-MCNC: 4.2 MMOL/L — SIGNIFICANT CHANGE UP (ref 3.5–5.3)
POTASSIUM SERPL-SCNC: 4.2 MMOL/L — SIGNIFICANT CHANGE UP (ref 3.5–5.3)
PROT SERPL-MCNC: 8.4 G/DL — HIGH (ref 6–8.3)
PROT UR-MCNC: 100
RBC # BLD: 4.38 M/UL — SIGNIFICANT CHANGE UP (ref 3.8–5.2)
RBC # FLD: 12.2 % — SIGNIFICANT CHANGE UP (ref 10.3–14.5)
SODIUM SERPL-SCNC: 137 MMOL/L — SIGNIFICANT CHANGE UP (ref 135–145)
SP GR SPEC: 1.02 — SIGNIFICANT CHANGE UP (ref 1.01–1.02)
UROBILINOGEN FLD QL: NEGATIVE — SIGNIFICANT CHANGE UP
WBC # BLD: 11.6 K/UL — HIGH (ref 3.8–10.5)
WBC # FLD AUTO: 11.6 K/UL — HIGH (ref 3.8–10.5)

## 2018-07-25 PROCEDURE — 99284 EMERGENCY DEPT VISIT MOD MDM: CPT | Mod: 25

## 2018-07-25 PROCEDURE — 96375 TX/PRO/DX INJ NEW DRUG ADDON: CPT

## 2018-07-25 PROCEDURE — 85027 COMPLETE CBC AUTOMATED: CPT

## 2018-07-25 PROCEDURE — 76705 ECHO EXAM OF ABDOMEN: CPT

## 2018-07-25 PROCEDURE — 96374 THER/PROPH/DIAG INJ IV PUSH: CPT

## 2018-07-25 PROCEDURE — 76705 ECHO EXAM OF ABDOMEN: CPT | Mod: 26

## 2018-07-25 PROCEDURE — 80053 COMPREHEN METABOLIC PANEL: CPT

## 2018-07-25 PROCEDURE — 84702 CHORIONIC GONADOTROPIN TEST: CPT

## 2018-07-25 PROCEDURE — 81001 URINALYSIS AUTO W/SCOPE: CPT

## 2018-07-25 PROCEDURE — 83690 ASSAY OF LIPASE: CPT

## 2018-07-25 PROCEDURE — 99284 EMERGENCY DEPT VISIT MOD MDM: CPT

## 2018-07-25 RX ORDER — ONDANSETRON 8 MG/1
4 TABLET, FILM COATED ORAL ONCE
Qty: 0 | Refills: 0 | Status: COMPLETED | OUTPATIENT
Start: 2018-07-25 | End: 2018-07-25

## 2018-07-25 RX ORDER — FAMOTIDINE 10 MG/ML
20 INJECTION INTRAVENOUS ONCE
Qty: 0 | Refills: 0 | Status: COMPLETED | OUTPATIENT
Start: 2018-07-25 | End: 2018-07-25

## 2018-07-25 RX ORDER — LIDOCAINE 4 G/100G
20 CREAM TOPICAL ONCE
Qty: 0 | Refills: 0 | Status: COMPLETED | OUTPATIENT
Start: 2018-07-25 | End: 2018-07-25

## 2018-07-25 RX ORDER — FAMOTIDINE 10 MG/ML
1 INJECTION INTRAVENOUS
Qty: 30 | Refills: 0
Start: 2018-07-25

## 2018-07-25 RX ORDER — SODIUM CHLORIDE 9 MG/ML
1000 INJECTION INTRAMUSCULAR; INTRAVENOUS; SUBCUTANEOUS ONCE
Qty: 0 | Refills: 0 | Status: COMPLETED | OUTPATIENT
Start: 2018-07-25 | End: 2018-07-25

## 2018-07-25 RX ADMIN — FAMOTIDINE 20 MILLIGRAM(S): 10 INJECTION INTRAVENOUS at 09:49

## 2018-07-25 RX ADMIN — LIDOCAINE 15 MILLILITER(S): 4 CREAM TOPICAL at 09:49

## 2018-07-25 RX ADMIN — SODIUM CHLORIDE 1000 MILLILITER(S): 9 INJECTION INTRAMUSCULAR; INTRAVENOUS; SUBCUTANEOUS at 09:53

## 2018-07-25 RX ADMIN — Medication 30 MILLILITER(S): at 09:49

## 2018-07-25 RX ADMIN — ONDANSETRON 4 MILLIGRAM(S): 8 TABLET, FILM COATED ORAL at 09:45

## 2018-07-25 NOTE — ED PROVIDER NOTE - MEDICAL DECISION MAKING DETAILS
Pt with abd pain and vomiting x yesterday. Will get labs, ultrasound, GI cocktail and reassess. Pt with abd pain and vomiting x yesterday. Will get labs, ultrasound, GI cocktail and reassess.  labs sono normal. feeling better with IV fluids and GI cockatil. home with gi followup

## 2018-07-25 NOTE — ED PROVIDER NOTE - OBJECTIVE STATEMENT
36 y/o female with no significant PMHx presents to the ED c/o worsening chronic upper abd pain x yesterday. Pt notes associated vomiting. Pt has been following up with a gastro, had upper endoscopy x 2 months and was recently tested for H pylori which came back neg. GI believes Sx may be due to something with the gallbladder. Pt denies fever, chills, or any other complaints. NKDA.

## 2019-08-05 ENCOUNTER — RESULT REVIEW (OUTPATIENT)
Age: 39
End: 2019-08-05

## 2020-09-10 ENCOUNTER — APPOINTMENT (OUTPATIENT)
Dept: MRI IMAGING | Facility: CLINIC | Age: 40
End: 2020-09-10
Payer: COMMERCIAL

## 2020-09-10 ENCOUNTER — OUTPATIENT (OUTPATIENT)
Dept: OUTPATIENT SERVICES | Facility: HOSPITAL | Age: 40
LOS: 1 days | End: 2020-09-10

## 2020-09-10 PROCEDURE — 74183 MRI ABD W/O CNTR FLWD CNTR: CPT | Mod: 26

## 2020-09-11 ENCOUNTER — RESULT REVIEW (OUTPATIENT)
Age: 40
End: 2020-09-11

## 2020-10-21 ENCOUNTER — TRANSCRIPTION ENCOUNTER (OUTPATIENT)
Age: 40
End: 2020-10-21

## 2021-07-22 ENCOUNTER — ASOB RESULT (OUTPATIENT)
Age: 41
End: 2021-07-22

## 2021-07-22 ENCOUNTER — APPOINTMENT (OUTPATIENT)
Dept: MATERNAL FETAL MEDICINE | Facility: CLINIC | Age: 41
End: 2021-07-22
Payer: COMMERCIAL

## 2021-07-22 ENCOUNTER — APPOINTMENT (OUTPATIENT)
Dept: ANTEPARTUM | Facility: CLINIC | Age: 41
End: 2021-07-22
Payer: COMMERCIAL

## 2021-07-22 PROCEDURE — 99202 OFFICE O/P NEW SF 15 MIN: CPT | Mod: 95

## 2021-07-22 PROCEDURE — 76813 OB US NUCHAL MEAS 1 GEST: CPT | Mod: 59

## 2021-07-22 PROCEDURE — 76801 OB US < 14 WKS SINGLE FETUS: CPT

## 2021-07-22 PROCEDURE — 36416 COLLJ CAPILLARY BLOOD SPEC: CPT

## 2021-09-14 ENCOUNTER — APPOINTMENT (OUTPATIENT)
Dept: ANTEPARTUM | Facility: CLINIC | Age: 41
End: 2021-09-14
Payer: COMMERCIAL

## 2021-09-14 ENCOUNTER — ASOB RESULT (OUTPATIENT)
Age: 41
End: 2021-09-14

## 2021-09-14 PROCEDURE — 76811 OB US DETAILED SNGL FETUS: CPT

## 2021-12-30 ENCOUNTER — OUTPATIENT (OUTPATIENT)
Dept: INPATIENT UNIT | Facility: HOSPITAL | Age: 41
LOS: 1 days | Discharge: HOME | End: 2021-12-30
Payer: COMMERCIAL

## 2021-12-30 VITALS
HEART RATE: 55 BPM | DIASTOLIC BLOOD PRESSURE: 78 MMHG | TEMPERATURE: 98 F | SYSTOLIC BLOOD PRESSURE: 129 MMHG | RESPIRATION RATE: 16 BRPM

## 2021-12-30 DIAGNOSIS — O26.899 OTHER SPECIFIED PREGNANCY RELATED CONDITIONS, UNSPECIFIED TRIMESTER: ICD-10-CM

## 2021-12-30 DIAGNOSIS — Z90.49 ACQUIRED ABSENCE OF OTHER SPECIFIED PARTS OF DIGESTIVE TRACT: Chronic | ICD-10-CM

## 2021-12-30 DIAGNOSIS — Z98.891 HISTORY OF UTERINE SCAR FROM PREVIOUS SURGERY: Chronic | ICD-10-CM

## 2021-12-30 DIAGNOSIS — Z3A.00 WEEKS OF GESTATION OF PREGNANCY NOT SPECIFIED: ICD-10-CM

## 2021-12-30 DIAGNOSIS — Z98.890 OTHER SPECIFIED POSTPROCEDURAL STATES: Chronic | ICD-10-CM

## 2021-12-30 LAB
APPEARANCE UR: CLEAR — SIGNIFICANT CHANGE UP
BILIRUB UR-MCNC: NEGATIVE — SIGNIFICANT CHANGE UP
COLOR SPEC: SIGNIFICANT CHANGE UP
DIFF PNL FLD: NEGATIVE — SIGNIFICANT CHANGE UP
GLUCOSE UR QL: NEGATIVE — SIGNIFICANT CHANGE UP
KETONES UR-MCNC: NEGATIVE — SIGNIFICANT CHANGE UP
LEUKOCYTE ESTERASE UR-ACNC: NEGATIVE — SIGNIFICANT CHANGE UP
NITRITE UR-MCNC: NEGATIVE — SIGNIFICANT CHANGE UP
PH UR: 7 — SIGNIFICANT CHANGE UP (ref 5–8)
PROT UR-MCNC: NEGATIVE — SIGNIFICANT CHANGE UP
SP GR SPEC: 1.01 — SIGNIFICANT CHANGE UP (ref 1–1.05)
UROBILINOGEN FLD QL: SIGNIFICANT CHANGE UP

## 2021-12-30 RX ORDER — SODIUM CHLORIDE 9 MG/ML
1000 INJECTION, SOLUTION INTRAVENOUS ONCE
Refills: 0 | Status: COMPLETED | OUTPATIENT
Start: 2021-12-30 | End: 2021-12-30

## 2021-12-30 RX ADMIN — SODIUM CHLORIDE 1000 MILLILITER(S): 9 INJECTION, SOLUTION INTRAVENOUS at 21:14

## 2021-12-30 NOTE — OB RN TRIAGE NOTE - FALL HARM RISK - UNIVERSAL INTERVENTIONS
Bed in lowest position, wheels locked, appropriate side rails in place/Call bell, personal items and telephone in reach/Instruct patient to call for assistance before getting out of bed or chair/Non-slip footwear when patient is out of bed/[place] to call system/Physically safe environment - no spills, clutter or unnecessary equipment/Purposeful Proactive Rounding/Room/bathroom lighting operational, light cord in reach

## 2021-12-30 NOTE — OB PROVIDER TRIAGE NOTE - TERM DELIVERIES, OB PROFILE
Ms. Ruébn Freeman    It was a pleasure to see you at our visit today! Welcome to my practice here at [facility] in [facility]. By working together we can achieve more for your health, so please let me or any member of our team know if there is ever anything we can do to help or improve your experience with us. You may sign up for [employer] online to have access to results and communication. [address] is an excellent tool to access your record and my team for non-urgent needs such as questions, refills and appointments. You may receive a survey in the mail in the next few weeks from [facility]. If your care here today has been less than a top score, please let me know personally. Your feedback is very important to our growth as a team and your comments are how we impact change. Please enter any comments you have in the comment field of the survey. I look forward to further visits with you and working with you toward your best health.  Thank you for entrusting us with your care.     ~Dr. Martin Perdomo and team
1

## 2021-12-30 NOTE — OB PROVIDER TRIAGE NOTE - NSHPLABSRESULTS_GEN_ALL_CORE
Urinalysis Basic - ( 30 Dec 2021 20:41 )    Color: Light Yellow / Appearance: Clear / S.010 / pH: x  Gluc: x / Ketone: Negative  / Bili: Negative / Urobili: <2 mg/dL   Blood: x / Protein: Negative / Nitrite: Negative   Leuk Esterase: Negative / RBC: x / WBC x   Sq Epi: x / Non Sq Epi: x / Bacteria: x

## 2021-12-30 NOTE — OB PROVIDER TRIAGE NOTE - NSICDXPASTSURGICALHX_GEN_ALL_CORE_FT
PAST SURGICAL HISTORY:  H/O hernia repair six months old    History of cholecystectomy     Previous  section 2017

## 2021-12-30 NOTE — OB RN TRIAGE NOTE - NSNURSINGINSTR_OBGYN_ALL_OB_FT
pt evaluated for ptl, no evidence.  pt hydrated and d/c to home with instructions given by pearl crouch.

## 2021-12-30 NOTE — OB RN TRIAGE NOTE - NSICDXPASTSURGICALHX_GEN_ALL_CORE_FT
PAST SURGICAL HISTORY:  History of cholecystectomy     Previous  section 2017     PAST SURGICAL HISTORY:  H/O hernia repair six months old    History of cholecystectomy     Previous  section 2017

## 2021-12-30 NOTE — OB PROVIDER TRIAGE NOTE - NSHPPHYSICALEXAM_GEN_ALL_CORE
Vital Signs Last 24 Hrs  T(C): 36.9 (30 Dec 2021 18:18), Max: 36.9 (30 Dec 2021 18:18)  T(F): 98.4 (30 Dec 2021 18:18), Max: 98.4 (30 Dec 2021 18:18)  HR: 62 (30 Dec 2021 21:07) (55 - 72)  BP: 122/71 (30 Dec 2021 20:51) (122/71 - 129/78)  BP(mean): --  RR: 16 (30 Dec 2021 18:18) (16 - 16)  SpO2: 99% (30 Dec 2021 21:07) (91% - 100%)    Lungs: Anterior and posterior lung sounds clear upon auscultation  Cardiac: R/R/R  SVE: 1/50/-3  TAS: vertex presentation    NST reactive with moderate variability, cat 1  toco ctx 1-2 minutes

## 2021-12-30 NOTE — OB RN TRIAGE NOTE - NS_MODEOFARRIVAL_OBGYN_ALL_OB
Patient: Gm Kiran Date: 2020   : 1933    86 year old male      OUTPATIENT WOUND CARE PROGRESS NOTE    Supervising Wound Care / Hyperbaric Medicine Physician: Not Applicable  Consulting Provider:  ANGELA Griffin  Date of Consultation/Last Comprehensive Exam:  2020  Referring  Provider:  Aurora Thomas MD    SUBJECTIVE:    Chief Complaint:  Traumatic right leg wound    Wound/Ulcer Present:    Venous leg ulcer:  Current Vascular Assessment:  Physical exam, Ankle-brachial indices (SHAN) and Venous insufficiency study.  Current Venous Type:  Venous insufficiency      Has the patient received adequate compression therapy for equal to or greater than 4 weeks?  N/A    Additional Wound Category:  Traumatic ulcer     Maximum Baseline Ambulatory Status:  Walks with cane    History of Present Illness:  This is a 86 year old male referred to the Wound Program for a right lower extremity wound. This wound started on/about 2020 when patient scraped his leg on a ; he was seen by Urgent Care 2020 and was started on Doxycycline for cellulitis. Of note, the patient has underlying venous disease for which he has typically worn thigh-high 20-30 mmHg stockings but stopped doing so due to difficulty managing the stockings with the wound dressing. Of note, the stockings are over a year old.    Patient also has history of a right foot callus for which he was seen by Urgent Care 2020 for right foot pain/callus, and subsequently by the podiatrist (Dr Flores) 2020 for ulcer right 1st MPJ area; area was debrided; started Clindamycin.    At baseline, the patient lives independently. He has a medical history that includes type 2 diabetes, venous insufficiency, PAD, chronic kidney disease (stage 3), heart failure, and PMR.     Interval history: 2020  Patient presents to wound clinic today for f/u evaluation of RLE wound.  Seen by Dr. Cervantes on 2020 who noted  erythema of his recent phlebectomy incision sites.  He was started on PO doxycycline x1 week.  Overall doing well.  RLE wounds are painful to palpation.  He notes that he believes the redness of his RLE has improved.  He had to cut the wrap off of his foot earlier this week in order to wear shoes.  Otherwise he is tolerating his compression well.  Appetite has been normal.  Denies recent fever, chills, N/V/D or body aches.      Current Treatment Regimen:  Dressing:  Compression blue coflex and Medihoney   Frequency:  Twice a week   Changed by:  Home care agency nurse    Review of Systems:  Pertinent items are noted in HPI (history of present illness).    Past Medical History:   Diagnosis Date   • Anesthesia complication     Hard to wake up   • ARF (acute renal failure) (CMS/HCC)    • Back pain    • Bilateral inguinal hernia    • Chronic pain     low back pain   • Diabetes mellitus type 2, diet-controlled (CMS/HCC)     Checks blood sugar at home BID   • Diverticulosis    • DJD (degenerative joint disease)    • Gout    • Hemorrhoids    • HLD (hyperlipidemia)    • [place] (hard of hearing)     Slight - NO hearing aides   • HTN (hypertension)    • Hyperplastic colon polyp    • Ill-fitting dentures     No longer wears Upper Partial   • Impaired mobility     Using Cane or Walker with long distances   • Insomnia    • Kidney stone    • Myocardial infarction (CMS/HCC) 2012   • Polymyalgia rheumatica (CMS/HCC)    • S/P bilateral cataract extraction    • Sinus problem    • Sleep apnea     Tried CPAP numerous times but Ill fitting masks never worked out   • Teeth missing    • Venous insufficiency    • Wears reading eyeglasses      Past Surgical History:   Procedure Laterality Date   • Appendectomy  1955   • Cardiac catherization  12/17/2012    Moderate stenosis of large obtuse marginal.  The LAD and left main and RCA were free of disease   • Colonoscopy  01/07/2004    Normal, diverticulosis, Hemorrhoids   • Colonoscopy w biopsy   Alcohol use: Yes     Frequency: Monthly or less     Drinks per session: 1 or 2     Binge frequency: Never     Comment: Rare   • Drug use: No   • Sexual activity: Not on file   Lifestyle   • Physical activity:     Days per week: Not on file     Minutes per session: Not on file   • Stress: Not on file   Relationships   • Social connections:     Talks on phone: Not on file     Gets together: Not on file     Attends Confucianism service: Not on file     Active member of club or organization: Not on file     Attends meetings of clubs or organizations: Not on file     Relationship status: Not on file   • Intimate partner violence:     Fear of current or ex partner: Not on file     Emotionally abused: Not on file     Physically abused: Not on file     Forced sexual activity: Not on file   Other Topics Concern   • Not on file   Social History Narrative   • Not on file     Family History   Problem Relation Age of Onset   • Diabetes Sister    • Heart disease Brother    • Arthritis Mother    • Myocardial Infarction Father    • Chronic Bronchitis Son    • Diabetes Sister    • COPD Brother        Current Outpatient Medications   Medication Sig   • doxycycline hyclate (VIBRAMYCIN) 100 MG capsule Take 1 capsule by mouth 2 times daily for 10 days.   • rosuvastatin (CRESTOR) 20 MG tablet Take 1 tablet by mouth daily.   • metoPROLOL succinate (TOPROL-XL) 25 MG 24 hr tablet Take 1 tablet by mouth daily.   • omeprazole (PRILOSEC) 20 MG capsule TAKE 1 CAPSULE BY MOUTH  DAILY   • primidone (MYSOLINE) 50 MG tablet TAKE 1 TABLET BY MOUTH  NIGHTLY   • tamsulosin (FLOMAX) 0.4 MG Cap TAKE 1 CAPSULE BY MOUTH TWO TIMES DAILY   • sertraline (ZOLOFT) 100 MG tablet TAKE 1 TABLET BY MOUTH  NIGHTLY   • allopurinol (ZYLOPRIM) 100 MG tablet TAKE 1 TABLET BY MOUTH  DAILY   • spironolactone (ALDACTONE) 50 MG tablet TAKE 1 TABLET BY MOUTH  DAILY   • furosemide (LASIX) 40 MG tablet Take 3 tablets by mouth daily.   • pramipexole (MIRAPEX) 1 care RN    Vascular:  -Has seen Dr Aguiar (warm  -Patient has tolerated V/C/C; will switch to Dome/Coflex today in hopes of a 2-layer wrap being \"less bulky\" and able to fit in his shoes  -Continue tetragrip to LLE; on in the morning, off at night  -Encourage BLE elevation as often as possible    Offloading:  -Avoid pressure to the area as not to re-traumatize the wounds  -Frequent BLE elevation    Nutrition/Diabetes:  -Encourage increased protein intake to facilitate wound healing  -Last A1C 6.7% on 11/05/2019; this IS at goal  -Tight glycemic control is necessary to optimize wound healing (blood glucose <150 / A1C <7.0%)    Infection:  -Currently on x1 week doxycycline PO per Dr. Cervantes after noted erythema to incision sites; end of treatment 07/30/2020  -Denies s/sx of systemic infection  -No signs of acute wound infection noted today      Follow up in 2-3 weeks (patient prefers 3); sooner if needed.       Plan of Care:  Advanced Wound Care Recommendations:  As above  Percent Wound Closure from consult:  N/A  Care plan to augment wound closure:  Not applicable.  see above     Portions of this note are brought forward from Ann MILLER's note; reviewed and edited by me as appropriate.      ANGELA De La Cruz         Car

## 2021-12-30 NOTE — OB PROVIDER TRIAGE NOTE - NS_FETALHEARTRATE_OBGYN_ALL_OB_FT
89 Patient resting comfortably in bed, NAD, VSS. Swelling remains stable, no pain w/ EOM, no blurry vision. Juancarlos Vu PA-C Pt resting comfortably. NAD. No complaints. VSS.. -PA Ebony Hernandez Pt sleeping. NAD. No complaints. VSS. Continue to monitor. -ARLEY Hernandez Patient seen and examined at bedside in [facility].  No acute overnight events.  Left sided erythema and swelling unchanged.  Patient reports that she now notes mild right sided swelling which was not present yesterday.  Denies blurry vision, painful eye movement or fevers/chills. Patient seen and examined at bedside in [facility].  No acute overnight events.  Left sided erythema and swelling unchanged.  Patient reports that she now notes mild right sided swelling which was not present yesterday.  Denies blurry vision, painful eye movement or fevers/chills.    CDU Attending note -- Agree c above.  Symptoms unchanged, possibly worsening as per CDU staff.  Pt afebrile, vss, nontoxic appearing.  No vision changes.  L periorbital soft tissue c swelling, erythema, and crusting d/c.  No bullae or vesicular lesions noted.  Ophtho consulted, awaiting eval.  Will add steroids in the meantime.  Defer antiviral therapy to inpt.

## 2021-12-30 NOTE — OB PROVIDER TRIAGE NOTE - NSOBPROVIDERNOTE_OBGYN_ALL_OB_FT
@2100  Presented patient to Dr Bailey  Plan:  -1L bolus  -u/a pending  -repeat SVE @ 2230  -will continue to monitor patient @2100  Presented patient to Dr Bailey  Plan:  -1L bolus  -u/a pending  -repeat SVE @   -will continue to monitor patient    @  Repeat SVE: /-3  EFM: Baseline 140 bpm with moderate variability, +accels  toco ctx 3-4 minutes   1L bolus in progress    @0015  Patient reports 3/10 pain on numerical scale   patient denies contractions at this time  Maternal and fetal status reassuring  No evidence of PTL  d/w Dr Rodriguez  Plan:  -Patient cleared for discharge  -Patient will follow up with next scheduled appointment   -Fetal kick counts reviewed with patient  - labor precautions reviewed with patient  -Patient to increase hydration   -Written and verbal instructions given to patient, pt verbalizes understanding

## 2021-12-30 NOTE — OB PROVIDER TRIAGE NOTE - ADDITIONAL INSTRUCTIONS
-Patient will follow up with next scheduled appointment   -Fetal kick counts reviewed with patient  - labor precautions reviewed with patient  -Patient to increase hydration

## 2021-12-30 NOTE — OB PROVIDER TRIAGE NOTE - HISTORY OF PRESENT ILLNESS
42 y/o pt 35.1 weeks  presents to triage with c/o lower back pain and abdominal cramping since 11pm last night. pt reports abdominal pain feels like regular "period cramps" and reports pain is 4/10. pt denies bleeding and lof. pt denies any urinary symptoms. pt denies any recent intercourse or vaginal exam. pt endorses +fetal movement  Ap complicated by:  -Covid19+ 21    NKDA  PMH: denies   PSH:  42 y/o pt 35.1 weeks  presents to triage with c/o lower back pain and abdominal cramping since 11pm last night. pt reports abdominal pain feels like regular "period cramps" and reports pain is 4/10. pt denies bleeding and lof. pt denies any urinary symptoms. pt denies any recent intercourse or vaginal exam. pt endorses +fetal movement  Scheduled  21, last NPO @ 1500   Ap complicated by:  -Covid19+ 21    NKDA  PMH: denies   PSH:   Hernia repair @ 6 months   19'  Cholecystectomy 19' 40 y/o pt 35.1 weeks  presents to triage with c/o lower back pain and abdominal cramping since 11pm last night. pt reports abdominal pain feels like regular "period cramps" and reports pain is 4/10. pt denies bleeding and lof. pt denies any urinary symptoms. pt denies any recent intercourse or vaginal exam. pt endorses +fetal movement  Scheduled  21, last NPO @ 1500   Ap complicated by:  -Covid19+ 21    NKDA  PMH: denies   PSH:   Hernia repair @ 6 months   19'  Cholecystectomy 19'

## 2021-12-30 NOTE — OB RN TRIAGE NOTE - CHIEF COMPLAINT QUOTE
lower back pain since 11pm denies lof/vb/+fm lower back pain since 11pm last night denies lof/vb/+fm

## 2021-12-31 VITALS — HEART RATE: 58 BPM | DIASTOLIC BLOOD PRESSURE: 67 MMHG | SYSTOLIC BLOOD PRESSURE: 114 MMHG

## 2021-12-31 PROCEDURE — 76818 FETAL BIOPHYS PROFILE W/NST: CPT | Mod: 26

## 2021-12-31 PROCEDURE — 99214 OFFICE O/P EST MOD 30 MIN: CPT | Mod: 25

## 2021-12-31 PROCEDURE — 76816 OB US FOLLOW-UP PER FETUS: CPT | Mod: 26

## 2022-01-21 ENCOUNTER — OUTPATIENT (OUTPATIENT)
Dept: OUTPATIENT SERVICES | Facility: HOSPITAL | Age: 42
LOS: 1 days | End: 2022-01-21

## 2022-01-21 VITALS
DIASTOLIC BLOOD PRESSURE: 82 MMHG | OXYGEN SATURATION: 98 % | WEIGHT: 186.07 LBS | RESPIRATION RATE: 16 BRPM | HEIGHT: 62.25 IN | TEMPERATURE: 97 F | SYSTOLIC BLOOD PRESSURE: 116 MMHG | HEART RATE: 77 BPM

## 2022-01-21 DIAGNOSIS — Z98.890 OTHER SPECIFIED POSTPROCEDURAL STATES: Chronic | ICD-10-CM

## 2022-01-21 DIAGNOSIS — O34.211 MATERNAL CARE FOR LOW TRANSVERSE SCAR FROM PREVIOUS CESAREAN DELIVERY: ICD-10-CM

## 2022-01-21 DIAGNOSIS — Z98.891 HISTORY OF UTERINE SCAR FROM PREVIOUS SURGERY: Chronic | ICD-10-CM

## 2022-01-21 DIAGNOSIS — Z90.49 ACQUIRED ABSENCE OF OTHER SPECIFIED PARTS OF DIGESTIVE TRACT: Chronic | ICD-10-CM

## 2022-01-21 PROBLEM — K21.9 GASTRO-ESOPHAGEAL REFLUX DISEASE WITHOUT ESOPHAGITIS: Chronic | Status: ACTIVE | Noted: 2021-12-30

## 2022-01-21 LAB
APPEARANCE UR: CLEAR — SIGNIFICANT CHANGE UP
BILIRUB UR-MCNC: NEGATIVE — SIGNIFICANT CHANGE UP
BLD GP AB SCN SERPL QL: NEGATIVE — SIGNIFICANT CHANGE UP
COLOR SPEC: YELLOW — SIGNIFICANT CHANGE UP
DIFF PNL FLD: NEGATIVE — SIGNIFICANT CHANGE UP
GLUCOSE UR QL: NEGATIVE — SIGNIFICANT CHANGE UP
HCT VFR BLD CALC: 35.6 % — SIGNIFICANT CHANGE UP (ref 34.5–45)
HGB BLD-MCNC: 12 G/DL — SIGNIFICANT CHANGE UP (ref 11.5–15.5)
KETONES UR-MCNC: NEGATIVE — SIGNIFICANT CHANGE UP
LEUKOCYTE ESTERASE UR-ACNC: NEGATIVE — SIGNIFICANT CHANGE UP
MCHC RBC-ENTMCNC: 31.3 PG — SIGNIFICANT CHANGE UP (ref 27–34)
MCHC RBC-ENTMCNC: 33.7 GM/DL — SIGNIFICANT CHANGE UP (ref 32–36)
MCV RBC AUTO: 92.7 FL — SIGNIFICANT CHANGE UP (ref 80–100)
NITRITE UR-MCNC: NEGATIVE — SIGNIFICANT CHANGE UP
NRBC # BLD: 0 /100 WBCS — SIGNIFICANT CHANGE UP
NRBC # FLD: 0 K/UL — SIGNIFICANT CHANGE UP
PH UR: 6.5 — SIGNIFICANT CHANGE UP (ref 5–8)
PLATELET # BLD AUTO: 281 K/UL — SIGNIFICANT CHANGE UP (ref 150–400)
PROT UR-MCNC: ABNORMAL
RBC # BLD: 3.84 M/UL — SIGNIFICANT CHANGE UP (ref 3.8–5.2)
RBC # FLD: 13.5 % — SIGNIFICANT CHANGE UP (ref 10.3–14.5)
RH IG SCN BLD-IMP: POSITIVE — SIGNIFICANT CHANGE UP
SP GR SPEC: 1.02 — SIGNIFICANT CHANGE UP (ref 1–1.05)
UROBILINOGEN FLD QL: SIGNIFICANT CHANGE UP
WBC # BLD: 10.25 K/UL — SIGNIFICANT CHANGE UP (ref 3.8–10.5)
WBC # FLD AUTO: 10.25 K/UL — SIGNIFICANT CHANGE UP (ref 3.8–10.5)

## 2022-01-21 RX ORDER — FAMOTIDINE 10 MG/ML
0 INJECTION INTRAVENOUS
Qty: 0 | Refills: 0 | DISCHARGE

## 2022-01-21 NOTE — OB PST NOTE - HISTORY OF PRESENT ILLNESS
41 year old female presents to presurgical testing for evaluation for scheduled repeat . Pt denies complications during this pregnancy.  Patient denies vaginal bleeding, spotting or leakage of amniotic fluid. Patient denies regular contractions. Patient reports positive fetal movement.

## 2022-01-21 NOTE — OB PST NOTE - PROBLEM SELECTOR PLAN 1
Pt is tentatively scheduled for repeat  on 2/3/22. Pre-op instructions provided. Pt given verbal and written instructions with teach back on chlorhexidine shampoo. Pt strongly advised to follow up with surgeon to discuss COVID testing requirements prior to procedure.  Pt verbalized understanding with return demonstration.

## 2022-01-21 NOTE — OB PST NOTE - NSHPPHYSICALEXAM_GEN_ALL_CORE
Constitutional: well developed, well groomed, well nourished, no distress    Eyes: PERRL, EOMI, conjunctiva clear    Ears: normal    Mouth and Gums: normal, moist    Pharynx: no tenderness, discharge, or peritonsillar abscess    Tonsils: no redness, discharge, tenderness, or swelling    Neck: supple, no JVD, normal thyroid gland, no cervical or paraspinal tenderness    Breast: normal shape    Back: normal shape, ROM intact, strength intact, no vertebral tenderness    Respiratory: airway patent, breast sounds equal, good air movement, respiration non-labored, clear to auscultation bilaterally, no chest wall tenderness, no wheezes, rhonchi, or rales    Cardiovascular: regular rate and rhythm, no rubs, murmur, normal PMI    Gastrointestinal: gravid abdomen, non tender, normal bowel sounds    Extremities: no clubbing, cyanosis, or pedal edema    Vascular: carotid pulse normal, radial pulse normal, DP pulse normal, PT pulse normal    Neurological: alert and oriented x3, sensation intact, cranial nerves intact, normal strength    Skin: warm and dry, normal color    Lymph nodes: no anterior cervical lymphadenopathy    Musculoskeletal: ROM intact, normal strength, no joint swelling, warmth, or calf tenderness    Psychiatric: normal affect, normal behavior

## 2022-01-21 NOTE — OB PST NOTE - NSICDXPASTMEDICALHX_GEN_ALL_CORE_FT
PAST MEDICAL HISTORY:  Acid reflux     Maternal care for low transverse scar from previous  delivery

## 2022-01-21 NOTE — OB PST NOTE - NSHPREVIEWOFSYSTEMS_GEN_ALL_CORE
General: no fever, chills, sweating, anorexia, or weight loss. Had COVID-19 on 12/18/21.     Skin: no rashes, itching, or dryness    Breast: no tenderness, lumps, or nipple discharge    Ophtalmologic: +glasses. + floaters. no diplopia, photophobia, or blurred vision    ENMT: no hearing difficulty, ear pain, tinnitus, or vertigo. No sinus symptoms, nasal congestion, nasal discharge, or nasal obstruction    Respiratory and Thorax: no wheezing, dyspnea, or cough    Cardiovascular: no chest pain, palpitations, dyspnea on exertion, orthopnea, or peripheral edema    Gastrointestinal: +acid reflux. no nausea, vomiting, diarrhea, constipation, change in bowel movements, or abdominal pain    Genitourinary and Pelvis: no hematuria, renal colic, flank pain, dysuria, nocturia. No abnormal vaginal bleeding, vaginal discharge, spotting, pelvic pain, or vaginal leakage    Musculoskeletal: +lower back pain. no arthralgia, arthritis, joint swelling, muscle cramping, muscle weakness, neck pain, arm pain, or leg pain    Neurological: +numbness and tingling in hands usually in left when sleeping. no transient paralysis, weakness, or seizures. No syncope, tremors, vertigo, loss of sensation, difficulty walking, loss of consciousness    Psychiatric: +anxiety. no suicidal ideation, depression.     Hematology: no nose bleeding, easy bruising or bleeding, or skin lumps    Lymphatic: no enlarged or tender lymph nodes, or extremity swelling    Endocrine: denies DM. no heat or cold intolerance    Immunologic: no recurrent or persistent infections

## 2022-01-21 NOTE — OB PST NOTE - FALL HARM RISK - UNIVERSAL INTERVENTIONS
Bed in lowest position, wheels locked, appropriate side rails in place/Call bell, personal items and telephone in reach/Instruct patient to call for assistance before getting out of bed or chair/Non-slip footwear when patient is out of bed/[place] to call system/Physically safe environment - no spills, clutter or unnecessary equipment/Purposeful Proactive Rounding/Room/bathroom lighting operational, light cord in reach

## 2022-01-21 NOTE — OB PST NOTE - ANESTHESIA, PREVIOUS REACTION, PROFILE
"HR dropped to 30s after the  in 2017, but no problems after cholecystectomy in 2018" Denies family Hx of malignant hyperthermia

## 2022-01-21 NOTE — OB PST NOTE - NSICDXPASTSURGICALHX_GEN_ALL_CORE_FT
PAST SURGICAL HISTORY:  H/O hernia repair six months old    History of cholecystectomy 2018    Previous  section 2017

## 2022-01-21 NOTE — OB PST NOTE - NSANTHOSAYNRD_GEN_A_CORE
No. LEYDA screening performed.  STOP BANG Legend: 0-2 = LOW Risk; 3-4 = INTERMEDIATE Risk; 5-8 = HIGH Risk

## 2022-02-02 ENCOUNTER — INPATIENT (INPATIENT)
Facility: HOSPITAL | Age: 42
LOS: 1 days | Discharge: HOME | End: 2022-02-04
Attending: SPECIALIST | Admitting: SPECIALIST

## 2022-02-02 VITALS — TEMPERATURE: 98 F

## 2022-02-02 DIAGNOSIS — Z98.891 HISTORY OF UTERINE SCAR FROM PREVIOUS SURGERY: Chronic | ICD-10-CM

## 2022-02-02 DIAGNOSIS — Z3A.00 WEEKS OF GESTATION OF PREGNANCY NOT SPECIFIED: ICD-10-CM

## 2022-02-02 DIAGNOSIS — Z90.49 ACQUIRED ABSENCE OF OTHER SPECIFIED PARTS OF DIGESTIVE TRACT: Chronic | ICD-10-CM

## 2022-02-02 DIAGNOSIS — O26.899 OTHER SPECIFIED PREGNANCY RELATED CONDITIONS, UNSPECIFIED TRIMESTER: ICD-10-CM

## 2022-02-02 DIAGNOSIS — O42.92 FULL-TERM PREMATURE RUPTURE OF MEMBRANES, UNSPECIFIED AS TO LENGTH OF TIME BETWEEN RUPTURE AND ONSET OF LABOR: ICD-10-CM

## 2022-02-02 DIAGNOSIS — Z98.890 OTHER SPECIFIED POSTPROCEDURAL STATES: Chronic | ICD-10-CM

## 2022-02-02 LAB
BASOPHILS # BLD AUTO: 0.03 K/UL — SIGNIFICANT CHANGE UP (ref 0–0.2)
BASOPHILS NFR BLD AUTO: 0.2 % — SIGNIFICANT CHANGE UP (ref 0–2)
BLD GP AB SCN SERPL QL: NEGATIVE — SIGNIFICANT CHANGE UP
EOSINOPHIL # BLD AUTO: 0.04 K/UL — SIGNIFICANT CHANGE UP (ref 0–0.5)
EOSINOPHIL NFR BLD AUTO: 0.3 % — SIGNIFICANT CHANGE UP (ref 0–6)
HCT VFR BLD CALC: 39.5 % — SIGNIFICANT CHANGE UP (ref 34.5–45)
HGB BLD-MCNC: 13 G/DL — SIGNIFICANT CHANGE UP (ref 11.5–15.5)
IANC: 11.47 K/UL — HIGH (ref 1.5–8.5)
IMM GRANULOCYTES NFR BLD AUTO: 0.5 % — SIGNIFICANT CHANGE UP (ref 0–1.5)
LYMPHOCYTES # BLD AUTO: 15.4 % — SIGNIFICANT CHANGE UP (ref 13–44)
LYMPHOCYTES # BLD AUTO: 2.25 K/UL — SIGNIFICANT CHANGE UP (ref 1–3.3)
MCHC RBC-ENTMCNC: 31.1 PG — SIGNIFICANT CHANGE UP (ref 27–34)
MCHC RBC-ENTMCNC: 32.9 GM/DL — SIGNIFICANT CHANGE UP (ref 32–36)
MCV RBC AUTO: 94.5 FL — SIGNIFICANT CHANGE UP (ref 80–100)
MONOCYTES # BLD AUTO: 0.77 K/UL — SIGNIFICANT CHANGE UP (ref 0–0.9)
MONOCYTES NFR BLD AUTO: 5.3 % — SIGNIFICANT CHANGE UP (ref 2–14)
NEUTROPHILS # BLD AUTO: 11.47 K/UL — HIGH (ref 1.8–7.4)
NEUTROPHILS NFR BLD AUTO: 78.3 % — HIGH (ref 43–77)
NRBC # BLD: 0 /100 WBCS — SIGNIFICANT CHANGE UP
NRBC # FLD: 0 K/UL — SIGNIFICANT CHANGE UP
PLATELET # BLD AUTO: 260 K/UL — SIGNIFICANT CHANGE UP (ref 150–400)
RBC # BLD: 4.18 M/UL — SIGNIFICANT CHANGE UP (ref 3.8–5.2)
RBC # FLD: 13.8 % — SIGNIFICANT CHANGE UP (ref 10.3–14.5)
RH IG SCN BLD-IMP: POSITIVE — SIGNIFICANT CHANGE UP
WBC # BLD: 14.64 K/UL — HIGH (ref 3.8–10.5)
WBC # FLD AUTO: 14.64 K/UL — HIGH (ref 3.8–10.5)

## 2022-02-02 RX ORDER — OXYTOCIN 10 UNIT/ML
VIAL (ML) INJECTION
Qty: 20 | Refills: 0 | Status: DISCONTINUED | OUTPATIENT
Start: 2022-02-02 | End: 2022-02-03

## 2022-02-02 RX ORDER — CITRIC ACID/SODIUM CITRATE 300-500 MG
15 SOLUTION, ORAL ORAL EVERY 6 HOURS
Refills: 0 | Status: DISCONTINUED | OUTPATIENT
Start: 2022-02-02 | End: 2022-02-03

## 2022-02-02 RX ORDER — SODIUM CHLORIDE 9 MG/ML
1000 INJECTION, SOLUTION INTRAVENOUS
Refills: 0 | Status: DISCONTINUED | OUTPATIENT
Start: 2022-02-02 | End: 2022-02-03

## 2022-02-02 NOTE — OB PROVIDER H&P - NSHPPHYSICALEXAM_GEN_ALL_CORE
Assessment reveals VSS, abdomen soft, NT, gravid  VE 1/60/-3  BPP 8/8, SAMARA 11.5, vtx anterior placenta  Cat 1 FHT, ctx 2-4 on toco  EFW 7.5 by palp    FHT reviewed by Dr. Arteaga    Patient SROM'd clear fluid at 1855

## 2022-02-02 NOTE — OB RN TRIAGE NOTE - CHIEF COMPLAINT QUOTE
contractions q 4 minutes, the baby had a drop in the heart rate x1 with one contraction.      desires TOLAC.  Scheduled c/s tomorrow.

## 2022-02-02 NOTE — OB PROVIDER H&P - NS_SONONOTE_OBGYN_ALL_OB_FT
Handy Jean, PGY-1  Internal Medicine  Pager:  [phone]    Patient is a 80y old  Female who presents with a chief complaint of Flu (27 Mar 2019 08:46)      SUBJECTIVE / OVERNIGHT EVENTS:   Patient seen and examined at bedside. No acute events overnight. Patient reports feeling better, still wheezing.    MEDICATIONS  (STANDING):  ALBUTerol    90 MICROgram(s) HFA Inhaler 1 Puff(s) Inhalation every 4 hours  atorvastatin 10 milliGRAM(s) Oral at bedtime  buDESOnide   0.5 milliGRAM(s) Respule 0.5 milliGRAM(s) Inhalation two times a day  levothyroxine 75 MICROGram(s) Oral daily  losartan 25 milliGRAM(s) Oral daily  methylPREDNISolone sodium succinate Injectable 20 milliGRAM(s) IV Push three times a day  sodium chloride 0.9%. 1000 milliLiter(s) (75 mL/Hr) IV Continuous <Continuous>  tiotropium 18 MICROgram(s) Capsule 1 Capsule(s) Inhalation daily    MEDICATIONS  (PRN):  acetaminophen   Tablet .. 650 milliGRAM(s) Oral every 6 hours PRN Temp greater or equal to 38C (100.4F), Mild Pain (1 - 3), Moderate Pain (4 - 6)      CAPILLARY BLOOD GLUCOSE          I&O's Summary    27 Mar 2019 07:01  -  28 Mar 2019 07:00  --------------------------------------------------------  IN: 760 mL / OUT: 0 mL / NET: 760 mL        T(C): 36.8 (03-28-19 @ 07:45), Max: 36.8 (03-28-19 @ 07:45)  HR: 72 (03-28-19 @ 07:45) (71 - 85)  BP: 139/83 (03-28-19 @ 07:45) (138/77 - 153/82)  RR: 18 (03-28-19 @ 07:45) (18 - 20)  SpO2: 99% (03-28-19 @ 07:45) (97% - 100%)    PHYSICAL EXAM:  GENERAL: NAD, well-developed  HEAD:  Atraumatic, Normocephalic  EYES: PERRL. EOMI,  conjunctiva and sclera clear.  NECK: Supple  CHEST/LUNG: Scattered wheezes  HEART: Regular rate and rhythm. Normal sounding S1, S2. No murmurs, rubs, or gallops  ABDOMEN: Soft, nontender, nondistended; Bowel sounds present.  EXTREMITIES:  No clubbing, cyanosis, or edema. Peripheral pulses 2+ and symmetric.  PSYCH: AAOx3  NEUROLOGY: non-focal  SKIN: No rashes or lesions    LABS:    WBC Trend: 3.9<--        Creatinine Trend: 0.61<--, 1.56<--, 1.60<--            RADIOLOGY & ADDITIONAL TESTS:    Imaging Personally Reviewed:    Consultant(s) Notes Reviewed:      Care Discussed with Consultants/Other Providers: BPP 8/8

## 2022-02-02 NOTE — OB RN PATIENT PROFILE - FALL HARM RISK - UNIVERSAL INTERVENTIONS
Bed in lowest position, wheels locked, appropriate side rails in place/Call bell, personal items and telephone in reach/Instruct patient to call for assistance before getting out of bed or chair/Non-slip footwear when patient is out of bed/[place] to call system/Physically safe environment - no spills, clutter or unnecessary equipment/Purposeful Proactive Rounding/Room/bathroom lighting operational, light cord in reach

## 2022-02-02 NOTE — OB PROVIDER TRIAGE NOTE - NS_OBGYNHISTORY_OBGYN_ALL_OB_FT
2017 Ft  7-2- fetal distress at 4cm    AP course uncomplicated  Scheduled for rpt  tomorrow at 0730.  GBS negative

## 2022-02-02 NOTE — OB PROVIDER TRIAGE NOTE - NSHPPHYSICALEXAM_GEN_ALL_CORE
Assessment reveals VSS, abdomen soft, NT, gravid  VE 1/60/-3  BPP 8/8, SAMARA 11.5, vtx anterior placenta Assessment reveals VSS, abdomen soft, NT, gravid  VE 1/60/-3  BPP 8/8, SAMARA 11.5, vtx anterior placenta  Cat 1 FHT, ctx 2-4 on toco  EFW 7.5 by palp    FHT reviewed by Dr. Arteaga    Patient SROM'd clear fluid at 1855

## 2022-02-02 NOTE — OB PROVIDER H&P - HISTORY OF PRESENT ILLNESS
40yo  @ 40.0 presents sent from office for evaluation for deceleration x 1 on nst in office with ctx and ctx every 4 minutes. Denies LOF and reports spotting.   H/O COVID-19 pos 21  Tested covid negative 2022  Desires TOLAC    H/O 2017 Panic attacks- nothing recent  2018 Cholecystectomy  Inguinal Hernia Repair at age 8

## 2022-02-02 NOTE — OB RN TRIAGE NOTE - FALL HARM RISK - UNIVERSAL INTERVENTIONS
Bed in lowest position, wheels locked, appropriate side rails in place/Call bell, personal items and telephone in reach/Instruct patient to call for assistance before getting out of bed or chair/Non-slip footwear when patient is out of bed/[place] to call system/Physically safe environment - no spills, clutter or unnecessary equipment/Purposeful Proactive Rounding/Room/bathroom lighting operational, light cord in reach

## 2022-02-02 NOTE — OB PROVIDER H&P - ASSESSMENT
Admit for SROM/TOLAC  Cervical Castaneda for IOL  Pain management PRN  COVID-19 negative from 1/31  D/W Dr. Turk

## 2022-02-02 NOTE — OB PROVIDER H&P - NS_DILATION_OBGYN_ALL_OB_NU
1 Advancement-Rotation Flap Text: The defect edges were debeveled with a #15 scalpel blade.  Given the location of the defect, shape of the defect and the proximity to free margins an advancement-rotation flap was deemed most appropriate.  Using a sterile surgical marker, an appropriate flap was drawn incorporating the defect and placing the expected incisions within the relaxed skin tension lines where possible. The area thus outlined was incised deep to adipose tissue with a #15 scalpel blade.  The skin margins were undermined to an appropriate distance in all directions utilizing iris scissors.

## 2022-02-02 NOTE — OB PROVIDER TRIAGE NOTE - HISTORY OF PRESENT ILLNESS
42yo  @ 40.0 presents sent from office for evaluation for deceleration x 1 on nst in office with ctx and ctx every 4 minutes. Denies LOF and reports spotting.   H/O COVID-19 pos 21  Tested covid negative 2022  Desires TOLAC    H/O 2017 Panic attacks- nothing recent  2018 Cholecystectomy  Inguinal Hernia Repair at age 8

## 2022-02-03 ENCOUNTER — TRANSCRIPTION ENCOUNTER (OUTPATIENT)
Age: 42
End: 2022-02-03

## 2022-02-03 DIAGNOSIS — O26.899 OTHER SPECIFIED PREGNANCY RELATED CONDITIONS, UNSPECIFIED TRIMESTER: ICD-10-CM

## 2022-02-03 LAB
COVID-19 SPIKE DOMAIN AB INTERP: POSITIVE
COVID-19 SPIKE DOMAIN ANTIBODY RESULT: >250 U/ML — HIGH
SARS-COV-2 IGG+IGM SERPL QL IA: >250 U/ML — HIGH
SARS-COV-2 IGG+IGM SERPL QL IA: POSITIVE
T PALLIDUM AB TITR SER: NEGATIVE — SIGNIFICANT CHANGE UP

## 2022-02-03 RX ORDER — SODIUM CHLORIDE 9 MG/ML
1000 INJECTION INTRAMUSCULAR; INTRAVENOUS; SUBCUTANEOUS
Refills: 0 | Status: DISCONTINUED | OUTPATIENT
Start: 2022-02-03 | End: 2022-02-04

## 2022-02-03 RX ORDER — DIPHENHYDRAMINE HCL 50 MG
25 CAPSULE ORAL EVERY 6 HOURS
Refills: 0 | Status: DISCONTINUED | OUTPATIENT
Start: 2022-02-03 | End: 2022-02-04

## 2022-02-03 RX ORDER — OXYCODONE HYDROCHLORIDE 5 MG/1
5 TABLET ORAL
Refills: 0 | Status: DISCONTINUED | OUTPATIENT
Start: 2022-02-03 | End: 2022-02-04

## 2022-02-03 RX ORDER — IBUPROFEN 200 MG
600 TABLET ORAL EVERY 6 HOURS
Refills: 0 | Status: DISCONTINUED | OUTPATIENT
Start: 2022-02-03 | End: 2022-02-04

## 2022-02-03 RX ORDER — SODIUM CHLORIDE 9 MG/ML
1000 INJECTION, SOLUTION INTRAVENOUS ONCE
Refills: 0 | Status: COMPLETED | OUTPATIENT
Start: 2022-02-03 | End: 2022-02-03

## 2022-02-03 RX ORDER — SODIUM CHLORIDE 9 MG/ML
3 INJECTION INTRAMUSCULAR; INTRAVENOUS; SUBCUTANEOUS EVERY 8 HOURS
Refills: 0 | Status: DISCONTINUED | OUTPATIENT
Start: 2022-02-03 | End: 2022-02-04

## 2022-02-03 RX ORDER — OXYCODONE HYDROCHLORIDE 5 MG/1
5 TABLET ORAL ONCE
Refills: 0 | Status: DISCONTINUED | OUTPATIENT
Start: 2022-02-03 | End: 2022-02-04

## 2022-02-03 RX ORDER — ACETAMINOPHEN 500 MG
975 TABLET ORAL
Refills: 0 | Status: DISCONTINUED | OUTPATIENT
Start: 2022-02-03 | End: 2022-02-04

## 2022-02-03 RX ORDER — OXYTOCIN 10 UNIT/ML
VIAL (ML) INJECTION
Qty: 30 | Refills: 0 | Status: DISCONTINUED | OUTPATIENT
Start: 2022-02-03 | End: 2022-02-03

## 2022-02-03 RX ORDER — KETOROLAC TROMETHAMINE 30 MG/ML
30 SYRINGE (ML) INJECTION ONCE
Refills: 0 | Status: DISCONTINUED | OUTPATIENT
Start: 2022-02-03 | End: 2022-02-03

## 2022-02-03 RX ORDER — BENZOCAINE 10 %
1 GEL (GRAM) MUCOUS MEMBRANE EVERY 6 HOURS
Refills: 0 | Status: DISCONTINUED | OUTPATIENT
Start: 2022-02-03 | End: 2022-02-04

## 2022-02-03 RX ORDER — AER TRAVELER 0.5 G/1
1 SOLUTION RECTAL; TOPICAL EVERY 4 HOURS
Refills: 0 | Status: DISCONTINUED | OUTPATIENT
Start: 2022-02-03 | End: 2022-02-04

## 2022-02-03 RX ORDER — PRAMOXINE HYDROCHLORIDE 150 MG/15G
1 AEROSOL, FOAM RECTAL EVERY 4 HOURS
Refills: 0 | Status: DISCONTINUED | OUTPATIENT
Start: 2022-02-03 | End: 2022-02-04

## 2022-02-03 RX ORDER — HYDROCORTISONE 1 %
1 OINTMENT (GRAM) TOPICAL EVERY 6 HOURS
Refills: 0 | Status: DISCONTINUED | OUTPATIENT
Start: 2022-02-03 | End: 2022-02-04

## 2022-02-03 RX ORDER — DIBUCAINE 1 %
1 OINTMENT (GRAM) RECTAL EVERY 6 HOURS
Refills: 0 | Status: DISCONTINUED | OUTPATIENT
Start: 2022-02-03 | End: 2022-02-04

## 2022-02-03 RX ORDER — SODIUM CHLORIDE 9 MG/ML
300 INJECTION INTRAMUSCULAR; INTRAVENOUS; SUBCUTANEOUS ONCE
Refills: 0 | Status: COMPLETED | OUTPATIENT
Start: 2022-02-03 | End: 2022-02-03

## 2022-02-03 RX ORDER — MAGNESIUM HYDROXIDE 400 MG/1
30 TABLET, CHEWABLE ORAL
Refills: 0 | Status: DISCONTINUED | OUTPATIENT
Start: 2022-02-03 | End: 2022-02-04

## 2022-02-03 RX ORDER — TETANUS TOXOID, REDUCED DIPHTHERIA TOXOID AND ACELLULAR PERTUSSIS VACCINE, ADSORBED 5; 2.5; 8; 8; 2.5 [IU]/.5ML; [IU]/.5ML; UG/.5ML; UG/.5ML; UG/.5ML
0.5 SUSPENSION INTRAMUSCULAR ONCE
Refills: 0 | Status: DISCONTINUED | OUTPATIENT
Start: 2022-02-03 | End: 2022-02-04

## 2022-02-03 RX ORDER — SIMETHICONE 80 MG/1
80 TABLET, CHEWABLE ORAL EVERY 4 HOURS
Refills: 0 | Status: DISCONTINUED | OUTPATIENT
Start: 2022-02-03 | End: 2022-02-04

## 2022-02-03 RX ORDER — IBUPROFEN 200 MG
600 TABLET ORAL EVERY 6 HOURS
Refills: 0 | Status: COMPLETED | OUTPATIENT
Start: 2022-02-03 | End: 2023-01-02

## 2022-02-03 RX ORDER — OXYTOCIN 10 UNIT/ML
333.33 VIAL (ML) INJECTION
Qty: 20 | Refills: 0 | Status: DISCONTINUED | OUTPATIENT
Start: 2022-02-03 | End: 2022-02-04

## 2022-02-03 RX ORDER — LANOLIN
1 OINTMENT (GRAM) TOPICAL EVERY 6 HOURS
Refills: 0 | Status: DISCONTINUED | OUTPATIENT
Start: 2022-02-03 | End: 2022-02-04

## 2022-02-03 RX ADMIN — Medication 1000 MILLIUNIT(S)/MIN: at 14:53

## 2022-02-03 RX ADMIN — Medication 2 MILLIUNIT(S)/MIN: at 10:16

## 2022-02-03 RX ADMIN — SODIUM CHLORIDE 180 MILLILITER(S): 9 INJECTION INTRAMUSCULAR; INTRAVENOUS; SUBCUTANEOUS at 10:34

## 2022-02-03 RX ADMIN — SODIUM CHLORIDE 600 MILLILITER(S): 9 INJECTION INTRAMUSCULAR; INTRAVENOUS; SUBCUTANEOUS at 10:00

## 2022-02-03 RX ADMIN — Medication 30 MILLIGRAM(S): at 17:50

## 2022-02-03 RX ADMIN — Medication 30 MILLIGRAM(S): at 17:14

## 2022-02-03 RX ADMIN — SODIUM CHLORIDE 2000 MILLILITER(S): 9 INJECTION, SOLUTION INTRAVENOUS at 10:58

## 2022-02-03 RX ADMIN — SODIUM CHLORIDE 125 MILLILITER(S): 9 INJECTION, SOLUTION INTRAVENOUS at 10:10

## 2022-02-03 RX ADMIN — SODIUM CHLORIDE 3 MILLILITER(S): 9 INJECTION INTRAMUSCULAR; INTRAVENOUS; SUBCUTANEOUS at 21:26

## 2022-02-03 NOTE — OB PROVIDER DELIVERY SUMMARY - NSSELHIDDEN_OBGYN_ALL_OB_FT
[NS_DeliveryAttending1_OBGYN_ALL_OB_FT:[id number]],[NS_DeliveryAssist1_OBGYN_ALL_OB_FT:[id number]]

## 2022-02-03 NOTE — OB RN DELIVERY SUMMARY - NS_SEPSISRSKCALC_OBGYN_ALL_OB_FT
EOS calculated successfully. EOS Risk Factor: 0.5/1000 live births ([facility] national incidence); GA=40w1d; Temp=98.6; ROM=19.8; GBS='Negative'; Antibiotics='No antibiotics or any antibiotics < 2 hrs prior to birth'

## 2022-02-03 NOTE — OB RN DELIVERY SUMMARY - AS DELIV COMPLICATIONS OB
abnormal fetal heart rate tracing abnormal fetal heart rate tracing/prolonged rupture of membranes/meconium stained fluid

## 2022-02-03 NOTE — OB PROVIDER LABOR PROGRESS NOTE - ASSESSMENT
TOLAC labor - reviewed with PT  AROM forbag - minimal blood tinged  IUPC placed  For pitocn
  Plan:  TOLAC admitted with PROM in stable condition  - pt made change s/p SROM from 1 to 2cm  - CB in place  - Continuous EFM, [place]  - Con't IVF    Plan per attending Dr. Burke Sanford-Brijesh, PGY-1

## 2022-02-03 NOTE — OB PROVIDER LABOR PROGRESS NOTE - NS_SUBJECTIVE/OBJECTIVE_OBGYN_ALL_OB_FT
PGY1 Labor & Delivery Progress Note     Pt seen & examined at bedside for CB.     T(C): 36.5 (02-02-22 @ 21:07), Max: 36.8 (02-02-22 @ 18:06)  HR: 53 (02-02-22 @ 20:44) (53 - 57)  BP: 122/69 (02-02-22 @ 20:44) (116/72 - 122/69)  RR: 18 (02-02-22 @ 20:44) (17 - 18)  SpO2: --
Comfortable SP epidural

## 2022-02-03 NOTE — OB PROVIDER DELIVERY SUMMARY - NSPROVIDERDELIVERYNOTE_OBGYN_ALL_OB_FT
Spontaneous vaginal delivery of liveborn infant from OA position. Head, shoulders, and body delivered easily. Infant passed to mother. Cord was clamped and cut. Placenta delivered spontaneously, noted to be intact. Fundal massage was given and uterine fundus was found to be firm. Vaginal exam revealed intact cervix. Bilateral sulcal lacerations noted and repaired in standard fashion with chromic suture. Excellent hemostasis was noted.  Left labial laceration also noted and repaired in standard fasion with chromic suture. Patient stable. Count correct x 2.

## 2022-02-03 NOTE — OB PROVIDER LABOR PROGRESS NOTE - NS_OBIHIFHRDETAILS_OBGYN_ALL_OB_FT
EFM: 135/mod. variability/+accles/-decels  [place]: q3-4 min
Cat1
Attending Attestation (For Attendings USE Only)...

## 2022-02-03 NOTE — OB RN DELIVERY SUMMARY - NSSELHIDDEN_OBGYN_ALL_OB_FT
[NS_DeliveryAttending1_OBGYN_ALL_OB_FT:[id number]],[NS_DeliveryAssist1_OBGYN_ALL_OB_FT:[id number]],[NS_DeliveryRN_OBGYN_ALL_OB_FT:[id number]],[NS_CirculateRN2_OBGYN_ALL_OB_FT:[id number]]

## 2022-02-04 VITALS
RESPIRATION RATE: 18 BRPM | HEART RATE: 73 BPM | DIASTOLIC BLOOD PRESSURE: 57 MMHG | TEMPERATURE: 98 F | SYSTOLIC BLOOD PRESSURE: 110 MMHG | OXYGEN SATURATION: 100 %

## 2022-02-04 LAB
HCT VFR BLD CALC: 23.6 % — LOW (ref 34.5–45)
HGB BLD-MCNC: 7.9 G/DL — LOW (ref 11.5–15.5)

## 2022-02-04 RX ORDER — FERROUS SULFATE 325(65) MG
325 TABLET ORAL THREE TIMES A DAY
Refills: 0 | Status: DISCONTINUED | OUTPATIENT
Start: 2022-02-04 | End: 2022-02-04

## 2022-02-04 RX ORDER — FAMOTIDINE 10 MG/ML
1 INJECTION INTRAVENOUS
Qty: 0 | Refills: 0 | DISCHARGE

## 2022-02-04 RX ORDER — ACETAMINOPHEN 500 MG
3 TABLET ORAL
Qty: 0 | Refills: 0 | DISCHARGE
Start: 2022-02-04

## 2022-02-04 RX ORDER — IBUPROFEN 200 MG
1 TABLET ORAL
Qty: 0 | Refills: 0 | DISCHARGE
Start: 2022-02-04

## 2022-02-04 RX ORDER — ASCORBIC ACID 60 MG
500 TABLET,CHEWABLE ORAL THREE TIMES A DAY
Refills: 0 | Status: DISCONTINUED | OUTPATIENT
Start: 2022-02-04 | End: 2022-02-04

## 2022-02-04 RX ORDER — SUCRALFATE 1 G
0 TABLET ORAL
Qty: 0 | Refills: 0 | DISCHARGE

## 2022-02-04 RX ORDER — SENNA PLUS 8.6 MG/1
2 TABLET ORAL AT BEDTIME
Refills: 0 | Status: DISCONTINUED | OUTPATIENT
Start: 2022-02-04 | End: 2022-02-04

## 2022-02-04 RX ADMIN — Medication 600 MILLIGRAM(S): at 18:00

## 2022-02-04 RX ADMIN — Medication 975 MILLIGRAM(S): at 15:20

## 2022-02-04 RX ADMIN — SODIUM CHLORIDE 3 MILLILITER(S): 9 INJECTION INTRAMUSCULAR; INTRAVENOUS; SUBCUTANEOUS at 05:42

## 2022-02-04 RX ADMIN — Medication 975 MILLIGRAM(S): at 05:00

## 2022-02-04 RX ADMIN — Medication 500 MILLIGRAM(S): at 15:19

## 2022-02-04 RX ADMIN — Medication 1 TABLET(S): at 15:20

## 2022-02-04 RX ADMIN — SODIUM CHLORIDE 3 MILLILITER(S): 9 INJECTION INTRAMUSCULAR; INTRAVENOUS; SUBCUTANEOUS at 16:40

## 2022-02-04 RX ADMIN — Medication 600 MILLIGRAM(S): at 00:33

## 2022-02-04 RX ADMIN — Medication 975 MILLIGRAM(S): at 15:50

## 2022-02-04 RX ADMIN — Medication 975 MILLIGRAM(S): at 04:17

## 2022-02-04 RX ADMIN — Medication 325 MILLIGRAM(S): at 15:20

## 2022-02-04 RX ADMIN — Medication 600 MILLIGRAM(S): at 01:15

## 2022-02-04 RX ADMIN — Medication 600 MILLIGRAM(S): at 11:48

## 2022-02-04 RX ADMIN — Medication 975 MILLIGRAM(S): at 09:25

## 2022-02-04 RX ADMIN — Medication 975 MILLIGRAM(S): at 08:55

## 2022-02-04 RX ADMIN — Medication 600 MILLIGRAM(S): at 12:18

## 2022-02-04 NOTE — DISCHARGE NOTE OB - MATERIALS PROVIDED
[facility] [place] Screening Program/  Immunization Record/Guide to Postpartum Care/[facility] Hearing Screen Program/Back To Sleep Handout/Shaken Baby Prevention Handout/Birth Certificate Instructions/Discharge Medication Information for Patients and Families Pocket Guide

## 2022-02-04 NOTE — DISCHARGE NOTE OB - NS MD DC FALL RISK RISK
For information on Fall & Injury Prevention, visit: [email or web address] OR  [email or web address] OR  https://www.cdc.gov/steadi/patient.html

## 2022-02-04 NOTE — DISCHARGE NOTE OB - PATIENT PORTAL LINK FT
You can access the FollowMyHealth Patient Portal offered by [facility] by registering at the following website: [email or web address]. By joining [facility]’s FollowMyHealth portal, you will also be able to view your health information using other applications (apps) compatible with our system.

## 2022-02-04 NOTE — DISCHARGE NOTE OB - MEDICATION SUMMARY - MEDICATIONS TO TAKE
I will START or STAY ON the medications listed below when I get home from the hospital:    acetaminophen 325 mg oral tablet  -- 3 tab(s) by mouth every 8 hours  -- Indication: For Full-term premature rupture of membranes    ibuprofen 600 mg oral tablet  -- 1 tab(s) by mouth every 6 hours  -- Indication: For Other pregnancy-related conditions, antepartum    Prenatal Multivitamins  -- 1 tab(s) by mouth once a day  -- Indication: For Full-term premature rupture of membranes

## 2022-02-04 NOTE — DISCHARGE NOTE OB - CARE PLAN
1 Principal Discharge DX:	Vaginal birth after  ()  Assessment and plan of treatment:	REGULAR DIET  AMBULATION ENCOURAGED

## 2022-02-04 NOTE — PROGRESS NOTE ADULT - ASSESSMENT
A/P: 40yo PPD#1 s/p  .  Patient is stable and doing well post-partum.   - Pain well controlled, continue current pain regimen  - Increase ambulation, SCDs when not ambulating  - Continue regular diet  - appreciate social work for hx of panic attacks    Sylvia Arnold, PGY1

## 2022-02-04 NOTE — PROGRESS NOTE ADULT - SUBJECTIVE AND OBJECTIVE BOX
OB Progress Note:  PPD#1    S: 42yo  PPD#1 s/p . Patient feels well. Pain is well controlled. She is tolerating a regular diet and passing flatus. She is voiding spontaneously, and ambulating without difficulty. Denies CP/SOB. Denies lightheadedness/dizziness. Denies N/V.    O:  Vitals:  Vital Signs Last 24 Hrs  T(C): 36.6 (2022 05:11), Max: 37.0 (2022 13:32)  T(F): 97.9 (2022 05:11), Max: 98.6 (2022 13:32)  HR: 69 (2022 05:11) (49 - 115)  BP: 94/59 (2022 05:11) (94/59 - 144/64)  RR: 18 (2022 05:11) (17 - 18)  SpO2: 100% (2022 05:11) (78% - 100%)    MEDICATIONS  (STANDING):  acetaminophen     Tablet .. 975 milliGRAM(s) Oral <User Schedule>  diphtheria/tetanus/pertussis (acellular) Vaccine (ADAcel) 0.5 milliLiter(s) IntraMuscular once  ibuprofen  Tablet. 600 milliGRAM(s) Oral every 6 hours  oxytocin Infusion 333.333 milliUNIT(s)/Min (1000 mL/Hr) IV Continuous <Continuous>  prenatal multivitamin 1 Tablet(s) Oral daily  sodium chloride 0.9% lock flush 3 milliLiter(s) IV Push every 8 hours  sodium chloride 0.9%. 1000 milliLiter(s) (180 mL/Hr) IV Continuous <Continuous>      Labs:  Blood type: A Positive  Rubella IgG: RPR: Negative                          13.0   14.64<H> >-----------< 260    (  @ 20:28 )             39.5      Physical Exam:  General: NAD  Abdomen: soft, non-tender, non-distended, fundus firm  Vaginal: Lochia wnl  Extremities: No erythema/edema  
S: Patient doing well. Minimal lochia. Pain controlled.    O: Vital Signs Last 24 Hrs  T(C): 36.6 (2022 05:11), Max: 37.0 (2022 13:32)  T(F): 97.9 (2022 05:11), Max: 98.6 (2022 13:32)  HR: 69 (2022 05:11) (49 - 115)  BP: 94/59 (2022 05:11) (94/59 - 144/64)  BP(mean): --  RR: 18 (2022 05:11) (17 - 18)  SpO2: 100% (2022 05:11) (78% - 100%)    Gen: NAD  Abd: soft, NT, ND, fundus firm below umbilicus  Lochia: moderate  Ext: no tenderness    Labs:                        13.0   14.64 )-----------( 260      ( 2022 20:28 )             39.5       A: 41y PPD# s/p  doing well.  Plan: Routine  dc with instructions

## 2022-04-07 NOTE — OB PROVIDER H&P - NSDOBORLOSS1_OBGYN_ALL_OB_DT
Subjective:     CC: discuss labs, needs ortho referral    Adrián Butler is a 61 y.o. female who presents today to discuss lab results. She had labs drawn 2-3 weeks ago and her sodium was low at 131. She reports she does drink a lot of water and follows a very strict low-sodium diet. Denies any diarrhea and she is not taking any diuretics. She is also worried about her kidney function but her creatinine was normal and she had not protein in her urine. She is wondering why her lipid panel was not checked but I reminded her that her cholesterol panel was completely normal back in 9-2021 and we will recheck this annually. Her A1C was increased to 7.9% and she feels this is related to her inability to exercise on account of her chronic knee pain. She increased her lantus by 3 units as directed but states she started having hypoglycemic episodes early in the mornings so she cut back to 19 units daily and increased her mealtime insulin instead. She is trying to eat less to lose weight. Still trying to find an endocrinologist.     She has chronic left knee pain, denies any injury. Saw ortho Dr Bere Khan who told her she needs a \"clean out\" according to her MRI. She was worried that if she has the surgery at [facility], however, the hospital \"may not be capable of managing her as a type 1 diabetic with neuropathy. \"  She then saw ortho Dr Mary Westbrook who offered her Euflexa injections. She would like to proceed but needs a referral order for insurance purposes. BP elevated today in the 150's. It was rechecked manually and still elevated. It was at goal at her last appt on 3-11-22. She states she has not been sleeping well at night due to knee pain.       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 Take 1 pill daily with a 5mg daily for total daily dose 15mg), Disp: 90 Tablet, Rfl: 1    insulin glargine (LANTUS,BASAGLAR) 100 unit/mL (3 mL) inpn, Inject 18 units SQ every morning, Disp: 15 mL, Rfl: 0    Insulin Needles, Disposable, 31 gauge x 5/16\" ndle, Inject daily with insulin up to 4 times daily, Disp: 100 Each, Rfl: 11    Blood-Glucose Meter monitoring kit, Check sugar up to 5 times daily, DX: E10.40, Disp: 1 Kit, Rfl: 0    glucose blood VI test strips (ASCENSIA AUTODISC VI, ONE TOUCH ULTRA TEST VI) strip, Check sugar up to 5 times daily, DX: E10.40, Disp: 200 Strip, Rfl: 5    lancets misc, Check sugar up to 5 times daily, DX: E10.40, Disp: 200 Each, Rfl: 11    levothyroxine (SYNTHROID) 137 mcg tablet, Take 1 pill every morning 6 days per week, Disp: 90 Tablet, Rfl: 0    cholecalciferol, vitamin D3, (Vitamin D3) 50 mcg (2,000 unit) tab, Take 1 Tablet by mouth daily. , Disp: 90 Tablet, Rfl: 1    insulin lispro (HUMALOG) 100 unit/mL kwikpen, Inject three times daily before meals according to sliding scale.  Max dose 10 units, Disp: 5 Pen, Rfl: 0    Allergies   Allergen Reactions    Iodine Anaphylaxis    Sulfa (Sulfonamide Antibiotics) Angioedema       Past Surgical History:   Procedure Laterality Date    HX CARPAL TUNNEL RELEASE      HX  SECTION      HX COLONOSCOPY  2019    recommended 5 year followup colonoscopy       Social History     Tobacco Use   Smoking Status Never Smoker Smokeless Tobacco Never Used       Social History     Socioeconomic History    Marital status:    Tobacco Use    Smoking status: Never Smoker    Smokeless tobacco: Never Used   Substance and Sexual Activity    Alcohol use: Yes     Alcohol/week: 0.8 standard drinks     Types: 1 Glasses of wine per week    Drug use: Never    Sexual activity: Yes   Other Topics Concern     Service No    Blood Transfusions No    Caffeine Concern No    Occupational Exposure No    Hobby Hazards No    Sleep Concern No    Stress Concern No    Weight Concern No    Special Diet No    Back Care No    Exercise Yes    Bike Helmet No    Seat Belt Yes    Self-Exams Yes       Family History   Problem Relation Age of Onset    Asthma Father     Diabetes Father     Heart Disease Father     Hypertension Father     Hypertension Sister     Diabetes Son        ROS:  Gen: denies fever, chills, or fatigue  HEENT:denies H/A, nasal congestion, or sore throat  Resp: denies dyspnea, cough, or wheezing  CV: denies chest pain or pressure, +occas palpitations  Extremeties: denies edema, pallor, or cyanosis  Musculoskeletal: +chronic right low back pain with bilateral sciatica and muscle cramps   +chronic left knee pain  Neuro: + chronic numbness/tingling in BLE's , +occas dizziness  Skin: denies rashes or new lesions   Psych: denies anxiety, depression, kierra, or other changes in mood      Objective:     Visit Vitals  BP (!) 152/72 (BP 1 Location: Right arm)   Temp 97.2 °F (36.2 °C) (Temporal)   Ht 5' 6\" (1.676 m)   Wt 155 lb 9.6 oz (70.6 kg)   BMI 25.11 kg/m²         There is no height or weight on file to calculate BMI. General: Alert and oriented. +Wincing in pain when she stands up and ambulates. Well nourished. HEENT :  Eyes: Sclera white, conjunctiva clear. PERRLA. Extra ocular movements intact. Lungs: Breathing even and unlabored.  All lobes clear to auscultation bilaterally   Heart :RRR, S1 and S2 normal intensity, no extra heart sounds  Extremities: Non-edematous, no pallor or cyanosis. Feet are warm  Musculoskeletal: +left knee brace in place  Neuro: Cranial nerves grossly normal.  Psych: Mood and thought content appropriate for situation. Dressed appropriately and with good hygiene. Skin: Warm, dry, and intact. No lesions or discoloration. Assessment/ Plan:     Hyponatremia  She has been drinking 8 glasses of water daily  I advised her it is ok to cut back on her water intake as her sodium level was most likely diluted  F/U prn AMS, dizziness, confusion or other concerns    Chronic left knee pain  Per ortho, referral order placed    Type 1 diabetes  Lantus 21 units caused hypoglycemic episodes  Cont Lantus 19 units daily  Glucagon refilled- use prn low blood sugars  Cont Lantus 19 units SQ daily  Humalog- TID qAC according to sliding scale  Cont diabetic diet  Working on finding new Endocrinologist  F/U 6 months or sooner prn        Verbal and written instructions (see AVS) provided.  Patient expresses understanding of diagnosis and treatment plan. Health Maintenance Due   Topic Date Due    Hepatitis C Screening  Never done    Pneumococcal 0-64 years (1 of 2 - PPSV23) Never done    Foot Exam Q1  Never done    Eye Exam Retinal or Dilated  Never done    Shingrix Vaccine Age 50> (1 of 2) Never done    DTaP/Tdap/Td series (1 - Tdap) 07/21/2020    COVID-19 Vaccine (2 - Pfizer 3-dose series) 09/30/2021               Milana Murillo, NP 21-May-2017

## 2022-05-16 NOTE — OB PROVIDER TRIAGE NOTE - NSCHILDCOND1_OBGYN_ALL_OB
0 Hour(s) 1 Minute(s) decreased ability to use arms for pushing/pulling/decreased ability to use legs for bridging/pushing Living

## 2022-06-01 ENCOUNTER — RESULT REVIEW (OUTPATIENT)
Age: 42
End: 2022-06-01

## 2022-09-23 NOTE — OB RN PATIENT PROFILE - NS_SUPPORTPERSONNAME_OBGYN_ALL_OB_FT
Patient scheduled with Dr. Lynn 9/23 1000    ----- Message from Eduardo Palomares sent at 9/23/2022  9:00 AM CDT -----  .Type:  Needs Medical Advice    Who Called: Lupe  Symptoms (please be specific):    How long has patient had these symptoms:    Pharmacy name and phone #:    Would the patient rather a call back or a response via [facility]?   Best Call Back Number: [phone]  Additional Information: She is requesting to be seen today she has sharp pain in her back, she thinks it may be a kidney stone. Started in her back a week ago but now she has pain in her stomach. Again please give her a call back if she can be seen today.        
Mainor Alanis

## 2022-10-27 PROBLEM — O34.211 MATERNAL CARE FOR LOW TRANSVERSE SCAR FROM PREVIOUS CESAREAN DELIVERY: Chronic | Status: ACTIVE | Noted: 2022-01-21

## 2022-11-08 ENCOUNTER — APPOINTMENT (OUTPATIENT)
Dept: ORTHOPEDIC SURGERY | Facility: CLINIC | Age: 42
End: 2022-11-08
Payer: COMMERCIAL

## 2022-11-08 VITALS — HEIGHT: 62 IN | WEIGHT: 185 LBS | BODY MASS INDEX: 34.04 KG/M2

## 2022-11-08 DIAGNOSIS — Z00.00 ENCOUNTER FOR GENERAL ADULT MEDICAL EXAMINATION W/OUT ABNORMAL FINDINGS: ICD-10-CM

## 2022-11-08 PROCEDURE — 73130 X-RAY EXAM OF HAND: CPT | Mod: LT

## 2022-11-08 PROCEDURE — 99203 OFFICE O/P NEW LOW 30 MIN: CPT

## 2022-11-08 NOTE — HISTORY OF PRESENT ILLNESS
[de-identified] : 11/8/22:  Pt woke up one day with left hand pain.  Pt started wearing thumbster brace and felt relief.  Pt also has n/t in b/l hands that wake her from sleep\par lawyer HELENA

## 2022-11-08 NOTE — IMAGING
[de-identified] : left thumb base with shoulder deformity at the cmc.  TTP and positive basal grind at the cmc.\par median/ulnar/radial serve sensation intact\par ain/pin/ulnar motor intact\par palpable pulsres\par CR<2s\par full active range of motion otherwise\par + Tinnel's\par \par  [Left] : left hand [There are no fractures, subluxations or dislocations. No significant abnormalities are seen] : There are no fractures, subluxations or dislocations. No significant abnormalities are seen

## 2022-12-01 ENCOUNTER — NON-APPOINTMENT (OUTPATIENT)
Age: 42
End: 2022-12-01

## 2022-12-01 ENCOUNTER — APPOINTMENT (OUTPATIENT)
Dept: ORTHOPEDIC SURGERY | Facility: CLINIC | Age: 42
End: 2022-12-01
Payer: COMMERCIAL

## 2022-12-01 VITALS — HEIGHT: 62 IN | WEIGHT: 185 LBS | BODY MASS INDEX: 34.04 KG/M2

## 2022-12-01 PROCEDURE — 99213 OFFICE O/P EST LOW 20 MIN: CPT

## 2022-12-01 NOTE — HISTORY OF PRESENT ILLNESS
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 Pt still has mild right > left CTS and is to obtain cockup wrist braces as previously instructed.\par Pt denes associated weakness and states her symptoms are intermittent. \par \par 11/8/22:  Pt woke up one day with left hand pain.  Pt started wearing thumbster brace and felt relief.  Pt also has n/t in b/l hands that wake her from sleep\par HELENA,

## 2022-12-01 NOTE — ASSESSMENT
[FreeTextEntry1] : Pt will maintain night splints x 6 weeks.\par If no improvement will consider EMG testing of the upper extremities. \par Pt will rto in 6 weeks for f/u care.

## 2022-12-01 NOTE — IMAGING
[de-identified] : left thumb base with shoulder deformity at the cmc.  TTP and mildly positive basal grind at the cmc.\par median/ulnar/radial serve sensation intact.\par Mildly + Tinel Signs over the bilateral carpal tunnels only.\par ain/pin/ulnar motor intact\par palpable pulses\par CR<2s\par full active range of motion otherwise\par \par

## 2022-12-01 NOTE — RETURN TO WORK/SCHOOL
[FreeTextEntry1] : Pt requires ergonomic keyboard and mouse due to having bilateral carpal tunnel syndrome.

## 2023-02-14 ENCOUNTER — APPOINTMENT (OUTPATIENT)
Dept: ORTHOPEDIC SURGERY | Facility: CLINIC | Age: 43
End: 2023-02-14
Payer: COMMERCIAL

## 2023-02-14 VITALS — BODY MASS INDEX: 34.04 KG/M2 | HEIGHT: 62 IN | WEIGHT: 185 LBS

## 2023-02-14 DIAGNOSIS — Z86.39 PERSONAL HISTORY OF OTHER ENDOCRINE, NUTRITIONAL AND METABOLIC DISEASE: ICD-10-CM

## 2023-02-14 DIAGNOSIS — G56.01 CARPAL TUNNEL SYNDROME, RIGHT UPPER LIMB: ICD-10-CM

## 2023-02-14 DIAGNOSIS — G56.02 CARPAL TUNNEL SYNDROME, LEFT UPPER LIMB: ICD-10-CM

## 2023-02-14 DIAGNOSIS — M18.12 UNILATERAL PRIMARY OSTEOARTHRITIS OF FIRST CARPOMETACARPAL JOINT, LEFT HAND: ICD-10-CM

## 2023-02-14 PROCEDURE — 99213 OFFICE O/P EST LOW 20 MIN: CPT

## 2023-02-14 RX ORDER — MELATONIN 3 MG
TABLET ORAL
Refills: 0 | Status: ACTIVE | COMMUNITY

## 2023-02-14 NOTE — IMAGING
[de-identified] : left thumb base with shoulder deformity at the cmc. \par Negative basal grind at the cmc.\par median/ulnar/radial serve sensation intact.\par Mildly + Tinel Signs over the bilateral carpal tunnels only.\par ain/pin/ulnar motor intact\par palpable pulses\par CR<2s\par full active range of motion otherwise\par \par

## 2023-02-14 NOTE — HISTORY OF PRESENT ILLNESS
[Tingling] : tingling [de-identified] : 2/14/2023: Pt here for f/u of bilateral carpal tunnel syndrome. She states left thumb CMC OA is not symptomatic.\par Pt denies associated weakness and she states her numbness/tingling is intermittent. 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 Pt still has mild right > left CTS and is to obtain cockup wrist braces as previously instructed.\par Pt denes associated weakness and states her symptoms are intermittent. \par \par 11/8/22:  Pt woke up one day with left hand pain.  Pt started wearing thumbster brace and felt relief.  Pt also has n/t in b/l hands that wake her from sleep\par lawyer HELENA

## 2023-02-14 NOTE — ASSESSMENT
[FreeTextEntry1] : Will continue night splints x 6 weeks.\par If no improvement will consider EMG testing of the upper extremities. \par Pt will rto in 6 weeks for f/u care.

## 2023-02-14 NOTE — REASON FOR VISIT
[FreeTextEntry2] : Follow up visit or left hand. Pt still has tingling in hand at night. Pt has no pain

## 2023-03-27 ENCOUNTER — APPOINTMENT (OUTPATIENT)
Dept: ORTHOPEDIC SURGERY | Facility: CLINIC | Age: 43
End: 2023-03-27

## 2023-06-15 DIAGNOSIS — Z01.818 ENCOUNTER FOR OTHER PREPROCEDURAL EXAMINATION: ICD-10-CM

## 2023-06-15 LAB — ABO + RH PNL BLD: NORMAL

## 2023-06-19 ENCOUNTER — APPOINTMENT (OUTPATIENT)
Dept: OBGYN | Facility: CLINIC | Age: 43
End: 2023-06-19
Payer: COMMERCIAL

## 2023-06-19 VITALS
DIASTOLIC BLOOD PRESSURE: 77 MMHG | HEIGHT: 62 IN | BODY MASS INDEX: 35.33 KG/M2 | WEIGHT: 192 LBS | SYSTOLIC BLOOD PRESSURE: 129 MMHG

## 2023-06-19 DIAGNOSIS — Z83.2 FAMILY HISTORY OF DISEASES OF THE BLOOD AND BLOOD-FORMING ORGANS AND CERTAIN DISORDERS INVOLVING THE IMMUNE MECHANISM: ICD-10-CM

## 2023-06-19 DIAGNOSIS — Z78.9 OTHER SPECIFIED HEALTH STATUS: ICD-10-CM

## 2023-06-19 DIAGNOSIS — Z33.2 ENCOUNTER FOR ELECTIVE TERMINATION OF PREGNANCY: ICD-10-CM

## 2023-06-19 DIAGNOSIS — K21.9 GASTRO-ESOPHAGEAL REFLUX DISEASE W/OUT ESOPHAGITIS: ICD-10-CM

## 2023-06-19 DIAGNOSIS — Z82.49 FAMILY HISTORY OF ISCHEMIC HEART DISEASE AND OTHER DISEASES OF THE CIRCULATORY SYSTEM: ICD-10-CM

## 2023-06-19 DIAGNOSIS — Z34.90 ENCOUNTER FOR SUPERVISION OF NORMAL PREGNANCY, UNSPECIFIED, UNSPECIFIED TRIMESTER: ICD-10-CM

## 2023-06-19 PROCEDURE — 99205 OFFICE O/P NEW HI 60 MIN: CPT | Mod: 25

## 2023-06-19 PROCEDURE — 99215 OFFICE O/P EST HI 40 MIN: CPT

## 2023-06-19 PROCEDURE — 59840 INDUCED ABORTION D&C: CPT

## 2023-06-19 PROCEDURE — 76815 OB US LIMITED FETUS(S): CPT

## 2023-06-19 PROCEDURE — 76998 US GUIDE INTRAOP: CPT | Mod: 59

## 2023-06-19 RX ORDER — OMEPRAZOLE 10 MG/1
10 CAPSULE, DELAYED RELEASE ORAL
Refills: 0 | Status: ACTIVE | COMMUNITY

## 2023-06-20 LAB
C TRACH RRNA SPEC QL NAA+PROBE: NOT DETECTED
N GONORRHOEA RRNA SPEC QL NAA+PROBE: NOT DETECTED
SOURCE AMPLIFICATION: NORMAL

## 2023-06-28 LAB — CORE LAB BIOPSY: NORMAL

## 2024-05-15 ENCOUNTER — APPOINTMENT (OUTPATIENT)
Dept: ORTHOPEDIC SURGERY | Facility: CLINIC | Age: 44
End: 2024-05-15
Payer: COMMERCIAL

## 2024-05-15 VITALS — HEIGHT: 62 IN | BODY MASS INDEX: 35.33 KG/M2 | WEIGHT: 192 LBS

## 2024-05-15 DIAGNOSIS — M77.41 METATARSALGIA, RIGHT FOOT: ICD-10-CM

## 2024-05-15 DIAGNOSIS — M54.16 RADICULOPATHY, LUMBAR REGION: ICD-10-CM

## 2024-05-15 DIAGNOSIS — M47.816 SPONDYLOSIS W/OUT MYELOPATHY OR RADICULOPATHY, LUMBAR REGION: ICD-10-CM

## 2024-05-15 DIAGNOSIS — M21.41 FLAT FOOT [PES PLANUS] (ACQUIRED), RIGHT FOOT: ICD-10-CM

## 2024-05-15 DIAGNOSIS — M65.9 SYNOVITIS AND TENOSYNOVITIS, UNSPECIFIED: ICD-10-CM

## 2024-05-15 PROCEDURE — 72170 X-RAY EXAM OF PELVIS: CPT

## 2024-05-15 PROCEDURE — 72110 X-RAY EXAM L-2 SPINE 4/>VWS: CPT

## 2024-05-15 PROCEDURE — 99214 OFFICE O/P EST MOD 30 MIN: CPT

## 2024-05-15 RX ORDER — METHOCARBAMOL 750 MG/1
750 TABLET, FILM COATED ORAL TWICE DAILY
Qty: 60 | Refills: 0 | Status: ACTIVE | COMMUNITY
Start: 2024-05-15 | End: 1900-01-01

## 2024-05-15 RX ORDER — METHYLPREDNISOLONE 4 MG/1
4 TABLET ORAL
Qty: 1 | Refills: 0 | Status: ACTIVE | COMMUNITY
Start: 2024-05-15 | End: 1900-01-01

## 2024-05-15 NOTE — IMAGING
[de-identified] : L ankle/foot: - No obvious ankle or foot deformity - No pain with palpation of achilles, medial or lateral malleoli, base of 5th metatarsal, navicular, metatarsals, or digits - ROM intact throughout ankle dorsiflexion, plantarflexion, inversion, eversion. Toes with normal flexion and extension. - 5/5 strength throughout - Distally neurovascularly intact   R ankle/foot: - No obvious ankle or foot deformity - No pain with palpation of achilles, medial or lateral malleoli, base of 5th metatarsal, navicular, metatarsals, or digits - Pain with passive stretch of extensor digitorum - Pain with activation of extensor digitorum - ROM intact throughout ankle dorsiflexion, plantarflexion, inversion, eversion. Toes with normal flexion and extension. - 5/5 strength throughout - Mild laxity with anterior drawer.  - Negative calcaneal and metatarsal squeeze - Distally neurovascularly intact    [FreeTextEntry1] : Degeneration noted at L4-5 and 5 1 [de-identified] : Normal

## 2024-05-15 NOTE — PHYSICAL EXAM
[Flexion] : flexion [Extension] : extension [Bending to left] : bending to left [Bending to right] : bending to right [] : positive tight hamstring

## 2024-05-15 NOTE — HISTORY OF PRESENT ILLNESS
[Lower back] : lower back [Sudden] : sudden [6] : 6 [Dull/Aching] : dull/aching [Sharp] : sharp [Constant] : constant [de-identified] : 05/15/2024 -she is for evaluation 6 to 8-week history of right-sided lower back pain/  She denies specific injury.  She states symptoms are worse with start up after prolonged sitting or sleeping for the night.  She has been taking ibuprofen 600 mg with minimal help. Denies bowel or bladder complaints  Also complains of R foot/ankle pain that she feels on the top of her foot with running. Hx of ankle sprain in the past. Using nike shoes.   She works a desk type position Prediabetic [] : no

## 2024-05-15 NOTE — ASSESSMENT
[FreeTextEntry1] : - will start pt on Medrol pack, Robaxin, and course of physical therapy. -  Diclofenac to be started after completion of medrol pack. - Recommend metatarsal cookie for metatarsal support. Try belinda and radha malik.  - f/u 6 weeks if pain persist would consider mri evaluation

## 2024-06-18 ENCOUNTER — RX RENEWAL (OUTPATIENT)
Age: 44
End: 2024-06-18

## 2024-06-18 RX ORDER — DICLOFENAC SODIUM 75 MG/1
75 TABLET, DELAYED RELEASE ORAL
Qty: 60 | Refills: 0 | Status: ACTIVE | COMMUNITY
Start: 2024-05-15 | End: 1900-01-01

## 2024-06-27 ENCOUNTER — APPOINTMENT (OUTPATIENT)
Dept: ORTHOPEDIC SURGERY | Facility: CLINIC | Age: 44
End: 2024-06-27

## 2025-01-14 ENCOUNTER — APPOINTMENT (OUTPATIENT)
Facility: CLINIC | Age: 45
End: 2025-01-14

## 2025-01-14 VITALS — SYSTOLIC BLOOD PRESSURE: 129 MMHG | DIASTOLIC BLOOD PRESSURE: 76 MMHG

## 2025-01-14 DIAGNOSIS — N92.6 IRREGULAR MENSTRUATION, UNSPECIFIED: ICD-10-CM

## 2025-01-14 LAB — HCG UR QL: NEGATIVE

## 2025-01-14 PROCEDURE — 81025 URINE PREGNANCY TEST: CPT

## 2025-01-14 PROCEDURE — 99459 PELVIC EXAMINATION: CPT

## 2025-01-14 PROCEDURE — 99213 OFFICE O/P EST LOW 20 MIN: CPT

## 2025-01-21 ENCOUNTER — NON-APPOINTMENT (OUTPATIENT)
Age: 45
End: 2025-01-21

## 2025-08-19 ENCOUNTER — APPOINTMENT (OUTPATIENT)
Facility: CLINIC | Age: 45
End: 2025-08-19